# Patient Record
Sex: MALE | Race: WHITE | NOT HISPANIC OR LATINO | Employment: FULL TIME | URBAN - METROPOLITAN AREA
[De-identification: names, ages, dates, MRNs, and addresses within clinical notes are randomized per-mention and may not be internally consistent; named-entity substitution may affect disease eponyms.]

---

## 2023-11-03 ENCOUNTER — TRANSFERRED RECORDS (OUTPATIENT)
Dept: HEALTH INFORMATION MANAGEMENT | Facility: CLINIC | Age: 53
End: 2023-11-03
Payer: COMMERCIAL

## 2023-11-03 ENCOUNTER — TRANSFERRED RECORDS (OUTPATIENT)
Dept: LAB | Facility: HOSPITAL | Age: 53
End: 2023-11-03

## 2023-11-03 ENCOUNTER — TRANSFERRED RECORDS (OUTPATIENT)
Dept: HEALTH INFORMATION MANAGEMENT | Facility: CLINIC | Age: 53
End: 2023-11-03

## 2023-11-09 ENCOUNTER — TRANSFERRED RECORDS (OUTPATIENT)
Dept: HEALTH INFORMATION MANAGEMENT | Facility: CLINIC | Age: 53
End: 2023-11-09

## 2023-11-09 ENCOUNTER — MEDICAL CORRESPONDENCE (OUTPATIENT)
Dept: HEALTH INFORMATION MANAGEMENT | Facility: CLINIC | Age: 53
End: 2023-11-09
Payer: COMMERCIAL

## 2023-11-10 ENCOUNTER — OFFICE VISIT (OUTPATIENT)
Dept: FAMILY MEDICINE | Facility: CLINIC | Age: 53
End: 2023-11-10
Payer: COMMERCIAL

## 2023-11-10 VITALS
HEIGHT: 73 IN | BODY MASS INDEX: 31.51 KG/M2 | DIASTOLIC BLOOD PRESSURE: 84 MMHG | RESPIRATION RATE: 18 BRPM | OXYGEN SATURATION: 97 % | SYSTOLIC BLOOD PRESSURE: 136 MMHG | HEART RATE: 60 BPM | WEIGHT: 237.8 LBS | TEMPERATURE: 96.7 F

## 2023-11-10 DIAGNOSIS — Z11.3 SCREEN FOR STD (SEXUALLY TRANSMITTED DISEASE): ICD-10-CM

## 2023-11-10 DIAGNOSIS — Z13.1 SCREENING FOR DIABETES MELLITUS: ICD-10-CM

## 2023-11-10 DIAGNOSIS — Z11.4 SCREENING FOR HIV (HUMAN IMMUNODEFICIENCY VIRUS): ICD-10-CM

## 2023-11-10 DIAGNOSIS — N30.00 ACUTE CYSTITIS WITHOUT HEMATURIA: ICD-10-CM

## 2023-11-10 DIAGNOSIS — K63.89 COLONIC MASS: ICD-10-CM

## 2023-11-10 DIAGNOSIS — E78.5 HYPERLIPIDEMIA LDL GOAL <100: Primary | ICD-10-CM

## 2023-11-10 LAB
ALBUMIN MFR UR ELPH: 13.1 MG/DL
ALBUMIN SERPL BCG-MCNC: 4.8 G/DL (ref 3.5–5.2)
ALBUMIN UR-MCNC: NEGATIVE MG/DL
ALP SERPL-CCNC: 68 U/L (ref 40–129)
ALT SERPL W P-5'-P-CCNC: 37 U/L (ref 0–70)
AMORPH CRY #/AREA URNS HPF: ABNORMAL /HPF
ANION GAP SERPL CALCULATED.3IONS-SCNC: 11 MMOL/L (ref 7–15)
APPEARANCE UR: ABNORMAL
AST SERPL W P-5'-P-CCNC: 24 U/L (ref 0–45)
BACTERIA #/AREA URNS HPF: ABNORMAL /HPF
BASOPHILS # BLD AUTO: 0 10E3/UL (ref 0–0.2)
BASOPHILS NFR BLD AUTO: 1 %
BILIRUB SERPL-MCNC: 0.5 MG/DL
BILIRUB UR QL STRIP: NEGATIVE
BUN SERPL-MCNC: 15 MG/DL (ref 6–20)
CALCIUM SERPL-MCNC: 9.5 MG/DL (ref 8.6–10)
CHLORIDE SERPL-SCNC: 103 MMOL/L (ref 98–107)
CHOLEST SERPL-MCNC: 229 MG/DL
COLOR UR AUTO: YELLOW
CREAT SERPL-MCNC: 0.83 MG/DL (ref 0.67–1.17)
CREAT UR-MCNC: 113.4 MG/DL
DEPRECATED HCO3 PLAS-SCNC: 26 MMOL/L (ref 22–29)
EGFRCR SERPLBLD CKD-EPI 2021: >90 ML/MIN/1.73M2
EOSINOPHIL # BLD AUTO: 0.3 10E3/UL (ref 0–0.7)
EOSINOPHIL NFR BLD AUTO: 4 %
ERYTHROCYTE [DISTWIDTH] IN BLOOD BY AUTOMATED COUNT: 13 % (ref 10–15)
GLUCOSE SERPL-MCNC: 92 MG/DL (ref 70–99)
GLUCOSE UR STRIP-MCNC: NEGATIVE MG/DL
HBA1C MFR BLD: 5.6 % (ref 0–5.6)
HCT VFR BLD AUTO: 46.8 % (ref 40–53)
HDLC SERPL-MCNC: 44 MG/DL
HGB BLD-MCNC: 15.6 G/DL (ref 13.3–17.7)
HGB UR QL STRIP: ABNORMAL
IMM GRANULOCYTES # BLD: 0 10E3/UL
IMM GRANULOCYTES NFR BLD: 0 %
KETONES UR STRIP-MCNC: NEGATIVE MG/DL
LDLC SERPL CALC-MCNC: 154 MG/DL
LEUKOCYTE ESTERASE UR QL STRIP: ABNORMAL
LYMPHOCYTES # BLD AUTO: 2.2 10E3/UL (ref 0.8–5.3)
LYMPHOCYTES NFR BLD AUTO: 31 %
MCH RBC QN AUTO: 30.1 PG (ref 26.5–33)
MCHC RBC AUTO-ENTMCNC: 33.3 G/DL (ref 31.5–36.5)
MCV RBC AUTO: 90 FL (ref 78–100)
MONOCYTES # BLD AUTO: 0.7 10E3/UL (ref 0–1.3)
MONOCYTES NFR BLD AUTO: 10 %
NEUTROPHILS # BLD AUTO: 3.7 10E3/UL (ref 1.6–8.3)
NEUTROPHILS NFR BLD AUTO: 54 %
NITRATE UR QL: POSITIVE
NONHDLC SERPL-MCNC: 185 MG/DL
PH UR STRIP: 5.5 [PH] (ref 5–7)
PLATELET # BLD AUTO: 229 10E3/UL (ref 150–450)
POTASSIUM SERPL-SCNC: 4.4 MMOL/L (ref 3.4–5.3)
PROT SERPL-MCNC: 7.8 G/DL (ref 6.4–8.3)
PROT/CREAT 24H UR: 0.12 MG/MG CR (ref 0–0.2)
RBC # BLD AUTO: 5.19 10E6/UL (ref 4.4–5.9)
RBC #/AREA URNS AUTO: ABNORMAL /HPF
SODIUM SERPL-SCNC: 140 MMOL/L (ref 135–145)
SP GR UR STRIP: 1.02 (ref 1–1.03)
SQUAMOUS #/AREA URNS AUTO: ABNORMAL /LPF
TRIGL SERPL-MCNC: 154 MG/DL
UROBILINOGEN UR STRIP-ACNC: 0.2 E.U./DL
WBC # BLD AUTO: 6.9 10E3/UL (ref 4–11)
WBC #/AREA URNS AUTO: ABNORMAL /HPF
WBC CLUMPS #/AREA URNS HPF: PRESENT /HPF

## 2023-11-10 PROCEDURE — 80053 COMPREHEN METABOLIC PANEL: CPT | Performed by: PHYSICIAN ASSISTANT

## 2023-11-10 PROCEDURE — 87491 CHLMYD TRACH DNA AMP PROBE: CPT | Performed by: PHYSICIAN ASSISTANT

## 2023-11-10 PROCEDURE — 87591 N.GONORRHOEAE DNA AMP PROB: CPT | Performed by: PHYSICIAN ASSISTANT

## 2023-11-10 PROCEDURE — 83036 HEMOGLOBIN GLYCOSYLATED A1C: CPT | Performed by: PHYSICIAN ASSISTANT

## 2023-11-10 PROCEDURE — 81001 URINALYSIS AUTO W/SCOPE: CPT | Performed by: PHYSICIAN ASSISTANT

## 2023-11-10 PROCEDURE — 87186 SC STD MICRODIL/AGAR DIL: CPT | Performed by: PHYSICIAN ASSISTANT

## 2023-11-10 PROCEDURE — 80061 LIPID PANEL: CPT | Performed by: PHYSICIAN ASSISTANT

## 2023-11-10 PROCEDURE — 99204 OFFICE O/P NEW MOD 45 MIN: CPT | Performed by: PHYSICIAN ASSISTANT

## 2023-11-10 PROCEDURE — 85025 COMPLETE CBC W/AUTO DIFF WBC: CPT | Performed by: PHYSICIAN ASSISTANT

## 2023-11-10 PROCEDURE — 87389 HIV-1 AG W/HIV-1&-2 AB AG IA: CPT | Performed by: PHYSICIAN ASSISTANT

## 2023-11-10 PROCEDURE — 36415 COLL VENOUS BLD VENIPUNCTURE: CPT | Performed by: PHYSICIAN ASSISTANT

## 2023-11-10 PROCEDURE — 84156 ASSAY OF PROTEIN URINE: CPT | Performed by: PHYSICIAN ASSISTANT

## 2023-11-10 PROCEDURE — 87086 URINE CULTURE/COLONY COUNT: CPT | Performed by: PHYSICIAN ASSISTANT

## 2023-11-10 ASSESSMENT — PAIN SCALES - GENERAL: PAINLEVEL: NO PAIN (0)

## 2023-11-10 NOTE — PATIENT INSTRUCTIONS
Lab work done today.     Will follow-up on MyChart or over the phone.     Consider CT Coronary Artery Calcium score

## 2023-11-10 NOTE — PROGRESS NOTES
"Assessment & Plan   Hyperlipidemia LDL goal <100  Total cholesterol at 234 6 months ago and LDL at 171.  Has been working on lifestyle changes.  We will recheck his lipid panel today.  Has no history of heart disease in his family I will be at not early onset coronary artery disease.  We did discuss a calcium score and if his cholesterol remains significantly elevated patient will like to proceed with this testing.  - Lipid panel reflex to direct LDL Fasting; Future  - Comprehensive metabolic panel; Future    Colonic mass  Being evaluated by [facility].  On screening colonoscopy had a large polypoid mass in his colon that requires resection.  He is having a CT of his colon and recently got a CEA testing done.  We will continue to follow-up with [facility].  - CBC with Platelets & Differential; Future    Acute cystitis without hematuria  Per results urine culture showed E. coli that was successfully treated.  Patient has no other symptoms at this time.  He continues to have slightly darker and more foamy urine.  We discussed that this is unlikely be related to a UTI but we will recheck today as well as a urine protein to ensure he is not having significant proteinuria.  - UA Macroscopic with reflex to Microscopic and Culture - Lab Collect; Future  - Protein  random urine; Future    Screening for HIV (human immunodeficiency virus)  Due for screening.   - HIV Antigen Antibody Combo; Future    Screen for STD (sexually transmitted disease)  Requested screening today.   - NEISSERIA GONORRHOEA PCR; Future  - CHLAMYDIA TRACHOMATIS PCR; Future    Screening for diabetes mellitus  Due for screening. BMI 31.   - Hemoglobin A1c; Future       BMI:   Estimated body mass index is 31.81 kg/m  as calculated from the following:    Height as of this encounter: 1.842 m (6' 0.5\").    Weight as of this encounter: 107.9 kg (237 lb 12.8 oz).       Peña Hill PA-C  [facility]    Claudine Latham is a " "53 year old, presenting for the following health issues:  Establish Care, Lipids, and STD        11/10/2023     8:02 AM   Additional Questions   Roomed by Chiquis ULLOA CMA   Accompanied by Self     HPI   Establish care  Lipids  -Has not had a physical since last year. At the time he was told his cholesterol was elevated, decided that they were going to try and control with diet and exercise. Never went back to follow up due to moving.     Follow up on UTI  Seen in the urgency room on 10/12/23  treated with antibiotic     Follow up on colonoscopy  -Says that he had a colonoscopy and has a mass that they determined is non cancerous but needs to have a section of his colon removed.     Health Maintenance   - He and his wife would like him to do the HIV screenings, Mentions also wanting an A1C    Review of Systems   See HPI         Objective    /84 (BP Location: Right arm, Patient Position: Sitting, Cuff Size: Adult Large)   Pulse 60   Temp (!) 96.7  F (35.9  C) (Tympanic)   Resp 18   Ht 1.842 m (6' 0.5\")   Wt 107.9 kg (237 lb 12.8 oz)   SpO2 97%   BMI 31.81 kg/m    Body mass index is 31.81 kg/m .  Physical Exam   Constitutional: healthy, alert, and no distress  Head: Normocephalic. Atraumatic  Eyes: No conjunctival injection, sclera anicteric  Cardiovascular: RRR. No murmurs, clicks, gallops, or rubs. No peripheral edema.   Respiratory: No resp distress. Lungs CTAB bilaterally.   Musculoskeletal: extremities normal- no gross deformities noted, and normal muscle tone  Skin: no suspicious lesions or rashes  Neurologic: Gait normal. CN 2-12 grossly intact  Psychiatric: mentation appears normal and affect normal/bright                 "

## 2023-11-10 NOTE — COMMUNITY RESOURCES LIST (ENGLISH)
11/10/2023    [employer] [facility]  N/A  For questions about this resource list or additional care needs, please contact your primary care clinic or care manager.  Phone: [phone]   Email: N/A   Address: [address] [place] [id number]   Hours: N/A        Financial Stability       Utility payment assistance  1  [place] [facility] [place] ([facility]) [facility] Office - Energy Assistance Program Distance: 2.9 miles      Phone/Virtual   [address] [place] [address]  Language: English  Hours: Mon - Fri 8:00 AM - 4:30 PM  Fees: Free   Phone: (665) 119-2424 Email: anjana@[facility] Website: https://www.[facility]/agency-information     2  [employer] Distance: 16.16 miles      In-Person, Phone/Virtual   [address] [place] [address]  Language: English  Hours: Mon - Sun Appt. Only  Fees: Free   Phone: (286) 393-5895 Email: nancy@[employer] Website: http://www.communityhelpinghand.org          Important Numbers & Websites       Emergency Services   911  [facility]   311  Poison Control   (736) 167-7214  Suicide Prevention Lifeline   (867) 276-8375 (TALK)  Child Abuse Hotline   (386) 334-7045 (4-A-Child)  Sexual Assault Hotline   (728) 642-7113 (HOPE)  National Runaway Safeline   (958) 986-6088 (RUNAWAY)  All-Options Talkline   (697) 960-3596  Substance Abuse Referral   (673) 836-4897 (HELP)

## 2023-11-11 LAB
BACTERIA UR CULT: ABNORMAL
C TRACH DNA SPEC QL NAA+PROBE: NEGATIVE
HIV 1+2 AB+HIV1 P24 AG SERPL QL IA: NONREACTIVE
N GONORRHOEA DNA SPEC QL NAA+PROBE: NEGATIVE

## 2023-11-13 RX ORDER — NITROFURANTOIN 25; 75 MG/1; MG/1
100 CAPSULE ORAL 2 TIMES DAILY
Qty: 14 CAPSULE | Refills: 0 | Status: SHIPPED | OUTPATIENT
Start: 2023-11-13 | End: 2023-12-11

## 2023-11-20 ENCOUNTER — TRANSFERRED RECORDS (OUTPATIENT)
Dept: HEALTH INFORMATION MANAGEMENT | Facility: CLINIC | Age: 53
End: 2023-11-20
Payer: COMMERCIAL

## 2023-12-11 ENCOUNTER — ANCILLARY PROCEDURE (OUTPATIENT)
Dept: GENERAL RADIOLOGY | Facility: CLINIC | Age: 53
End: 2023-12-11
Attending: PHYSICIAN ASSISTANT
Payer: COMMERCIAL

## 2023-12-11 ENCOUNTER — OFFICE VISIT (OUTPATIENT)
Dept: FAMILY MEDICINE | Facility: CLINIC | Age: 53
End: 2023-12-11
Payer: COMMERCIAL

## 2023-12-11 ENCOUNTER — E-CONSULT (OUTPATIENT)
Dept: UROLOGY | Facility: CLINIC | Age: 53
End: 2023-12-11

## 2023-12-11 VITALS
OXYGEN SATURATION: 99 % | HEART RATE: 57 BPM | BODY MASS INDEX: 32.87 KG/M2 | SYSTOLIC BLOOD PRESSURE: 136 MMHG | HEIGHT: 73 IN | DIASTOLIC BLOOD PRESSURE: 80 MMHG | WEIGHT: 248 LBS | RESPIRATION RATE: 16 BRPM | TEMPERATURE: 97.4 F

## 2023-12-11 DIAGNOSIS — R35.0 URINARY FREQUENCY: ICD-10-CM

## 2023-12-11 DIAGNOSIS — N30.00 ACUTE CYSTITIS WITHOUT HEMATURIA: Primary | ICD-10-CM

## 2023-12-11 DIAGNOSIS — K76.0 FATTY LIVER: ICD-10-CM

## 2023-12-11 LAB
ALBUMIN UR-MCNC: NEGATIVE MG/DL
APPEARANCE UR: ABNORMAL
BACTERIA #/AREA URNS HPF: ABNORMAL /HPF
BILIRUB UR QL STRIP: NEGATIVE
COLOR UR AUTO: YELLOW
GLUCOSE UR STRIP-MCNC: NEGATIVE MG/DL
HGB UR QL STRIP: ABNORMAL
HOLD SPECIMEN: NORMAL
KETONES UR STRIP-MCNC: NEGATIVE MG/DL
LEUKOCYTE ESTERASE UR QL STRIP: ABNORMAL
MUCOUS THREADS #/AREA URNS LPF: PRESENT /LPF
NITRATE UR QL: POSITIVE
PH UR STRIP: 6 [PH] (ref 5–7)
PSA SERPL DL<=0.01 NG/ML-MCNC: 1.95 NG/ML (ref 0–3.5)
RBC #/AREA URNS AUTO: ABNORMAL /HPF
SP GR UR STRIP: 1.01 (ref 1–1.03)
UROBILINOGEN UR STRIP-ACNC: 0.2 E.U./DL
WBC #/AREA URNS AUTO: ABNORMAL /HPF
WBC CLUMPS #/AREA URNS HPF: PRESENT /HPF

## 2023-12-11 PROCEDURE — 99215 OFFICE O/P EST HI 40 MIN: CPT | Performed by: PHYSICIAN ASSISTANT

## 2023-12-11 PROCEDURE — 81001 URINALYSIS AUTO W/SCOPE: CPT | Performed by: PHYSICIAN ASSISTANT

## 2023-12-11 PROCEDURE — 87086 URINE CULTURE/COLONY COUNT: CPT | Performed by: PHYSICIAN ASSISTANT

## 2023-12-11 PROCEDURE — 36415 COLL VENOUS BLD VENIPUNCTURE: CPT | Performed by: PHYSICIAN ASSISTANT

## 2023-12-11 PROCEDURE — 74018 RADEX ABDOMEN 1 VIEW: CPT | Mod: TC | Performed by: RADIOLOGY

## 2023-12-11 PROCEDURE — 87186 SC STD MICRODIL/AGAR DIL: CPT | Performed by: PHYSICIAN ASSISTANT

## 2023-12-11 PROCEDURE — 99207 E-CONSULT TO UROLOGY (ADULT OUTPT PROVIDER TO SPECIALIST WRITTEN QUESTION & RESPONSE): CPT | Performed by: PHYSICIAN ASSISTANT

## 2023-12-11 PROCEDURE — G0103 PSA SCREENING: HCPCS | Performed by: PHYSICIAN ASSISTANT

## 2023-12-11 RX ORDER — CIPROFLOXACIN 500 MG/1
500 TABLET, FILM COATED ORAL 2 TIMES DAILY
Qty: 14 TABLET | Refills: 0 | Status: SHIPPED | OUTPATIENT
Start: 2023-12-11 | End: 2023-12-22

## 2023-12-11 ASSESSMENT — PAIN SCALES - GENERAL: PAINLEVEL: NO PAIN (0)

## 2023-12-11 NOTE — PROGRESS NOTES
Assessment & Plan   Acute cystitis without hematuria  Urinary frequency  Patient returns to clinic due to persistent urinary frequency.  He has been treated twice for a UTI that has not resolved.  Initially with cephalexin with Macrobid.  Urine cultures were positive for the exact same bacteria with same susceptibilities and despite being treated with 2 antibiotics that it was susceptible to his symptoms persist.  UA today was again grossly positive for infection.  A KUB x-ray was done to evaluate for a retained bladder/renal stone as the nidus for his infection but that was unremarkable.  PSA testing done as well today which was negative.  He was started on ciprofloxacin 500 mg twice daily and an E consult to urology was placed due to his persistent unsuccessfully treated UTI.  Unfortunately the timing of this is difficult as he is a scheduled right colectomy due to a colonic mass that was discovered on a screening colonoscopy.  This is scheduled for less than a month away.  The E consult to urology is to help us determine the next steps quickly in order for him to still get his colectomy done in less than 1 month.  Patient in agreement with the plan and will await E consult recommendations.  - ciprofloxacin (CIPRO) 500 MG tablet; Take 1 tablet (500 mg) by mouth 2 times daily  - Adult E-Consult to Urology (Outpt Provider to Specialist Written Question & Response)  - UA Macroscopic with reflex to Microscopic and Culture - Clinic Collect  - Urine Microscopic Exam  - Urine Culture  - PSA, screen; Future  - XR KUB; Future  - ciprofloxacin (CIPRO) 500 MG tablet; Take 1 tablet (500 mg) by mouth 2 times daily    Fatty liver  Seen on abdominal CT scan from [facility].  Release of record was done today to obtain those results.  He has a known history of more significant alcohol use disorder  in the remote past and he is also obese.  This would be too common etiologies for his fatty liver but will await further records  "from [facility].    I spent a total of 59 minutes on the day of the visit.   Time spent by me doing chart review, history and exam, documentation and further activities per the note     BMI:   Estimated body mass index is 33.15 kg/m  as calculated from the following:    Height as of this encounter: 1.842 m (6' 0.52\").    Weight as of this encounter: 112.5 kg (248 lb).     YASH [facility]    Claudine Latham is a 53 year old, presenting for the following health issues:  Urinary Problem        12/11/2023     6:43 AM   Additional Questions   Roomed by Nisreen IQBAL     [facility]   Patient is following up with his urinary tract infection. He has taken two rounds of antibiotics and would like to make sure the infection is gone.     Review of Systems   See [facility]       Objective    /80 (BP Location: Right arm, Cuff Size: Adult Large)   Pulse 57   Temp 97.4  F (36.3  C) (Tympanic)   Resp 16   Ht 1.842 m (6' 0.52\")   Wt 112.5 kg (248 lb)   SpO2 99%   BMI 33.15 kg/m    Body mass index is 33.15 kg/m .  Physical Exam   Constitutional: healthy, alert, and no distress  Head: Normocephalic. Atraumatic  Eyes: No conjunctival injection, sclera anicteric  Respiratory: No resp distress.  Musculoskeletal: extremities normal- no gross deformities noted, and normal muscle tone  Neurologic: Gait normal. CN 2-12 grossly intact  Psychiatric: mentation appears normal and affect normal/bright                 "

## 2023-12-11 NOTE — PROGRESS NOTES
12/11/2023     E-Consult has been accepted.    Interprofessional consultation requested by:  Peña Hill PA-C      Clinical Question/Purpose: MY CLINICAL QUESTION IS: The patient is a 53-year-old male with history of fatty liver disease, recent cecal mass found on colonoscopy (CEA testing negative), who returns to clinic for persistent urinary frequency and concern for ongoing UTI.  He was initially seen at an outside hospital per care everywhere and was diagnosed with a UTI.  Urine culture showed E. coli that was sensitive to Keflex.  He was started on this without improvement.Then he presented to clinic with me, and again his urinalysis and culture showed persistent infection with the same sensitivities on urine culture. He was treated with Marcobid. Unfortunately the patient's symptoms persisted and was seen again today due to persistent urinary frequency.  He has no systemic symptoms and his vitals and exam are unremarkable.  A KUB was done in clinic today to evaluate for a possible bladder stone but was unremarkable. PSA is wnl. I started him on ciprofloxacin 500 mg twice daily based previous culture results.    Patient assessment and information reviewed: urine culture result, 11/10/2023, E. Coli resistant to bactrim; above clinical question    Specimen: Urine - Urine specimen (specimen)  Component 2 mo ago   CULTURE RESULT Abnormal    CULTURE >100,000 CFU/mL Escherichia coli   Resulting Agency [facility]   Susceptibility    Organism Antibiotic Susceptibility   Escherichia coli TRIMETHOPRIM/SULF >=16/304: R   Escherichia coli AMPICILLIN <=2: S   Escherichia coli CEFAZOLIN-UC <=4: S    Comment: Cefazolin-UC interpretations are for therapy of uncomplicated UTIs due to E.coli, K.pneumoniae, or P.mirablis. Cefazolin breakpoint is used as a surrogate to predict results for the oral agents - cefdinir, cefuroxime, and cephalexin, when used for therapy of uncomplicated UTIs  due to E coli, K, pneumoniae, and P. mirabilis. The FDA recommends cefadroxil susceptibility can be deduced from cefazolin.   Escherichia coli GENTAMICIN <=1: S   Escherichia coli CEFTRIAXONE <=1: S   Escherichia coli CEFTAZIDIME <=1: S   Escherichia coli LEVOFLOXACIN <=0.12: S   Escherichia coli CIPROFLOXACIN <=0.25: S   Escherichia coli PIPERACILLIN/TAZO <=4: S   Escherichia coli AMPICILLIN/SULBACTAM <=2: S   Escherichia coli CEFEPIME <=1: S   Escherichia coli TOBRAMYCIN <=1: S   Escherichia coli MEROPENEM <=0.25: S   Escherichia coli NITROFURANTOIN <=16: S     Outside urine culture with same result    Recommendations: 52 yo M with a persistent UTI with same organism despite treatment with keflex and nitrofurantoin    Agree with starting on fluoroquinolone based on culture results, would give 10 day course    Persistence of same organism suggests incomplete clearance of the organism and a reservoir or nidus of infection. Common reasons from incomplete bladder emptying, bladder diverticulum, chronic prostatitis. Stones usually do not harbor E. Coli. I would make sure that he is emptying his bladder and if there is any concern for incomplete bladder emptying start him on tamsulosin. Get CT urogram to assess for any anatomic issues (stones, diverticulum etc). 10 day course cipro. If he is still having issues after this (or if anything comes up on CT urogram that needs addressing), then needs formal urology referral    Recs:  10 days cipro  Start tamsulosin if concern for incomplete bladder emptying  Get ct urogram  If any issues on ctu or if persistent issues then formal consult    The recommendations provided in this E-Consult are based on a review of clinical data pertinent to the clinical question presented, without a review of the patient's complete medical record or, the benefit of a comprehensive in-person or virtual patient evaluation. This consultation should not replace the clinical judgement and evaluation  of the provider ordering this E-Consult. Any new clinical issues, or changes in patient status since the filing of this E-Consult will need to be taken into account when assessing these recommendations. Please contact me if you have further questions.    My total time spent reviewing clinical information and formulating assessment was 5 minutes.      MD Waldemar Herrera MD   [facility]  [phone] clinic phone

## 2023-12-11 NOTE — PATIENT INSTRUCTIONS
KUB x-ray was negative.     PSA within normal limits.     Start Cipro as discussed.     E-consult is pending. Will follow-up after recommendations.

## 2023-12-12 ENCOUNTER — TELEPHONE (OUTPATIENT)
Dept: FAMILY MEDICINE | Facility: CLINIC | Age: 53
End: 2023-12-12
Payer: COMMERCIAL

## 2023-12-12 DIAGNOSIS — N30.00 ACUTE CYSTITIS WITHOUT HEMATURIA: Primary | ICD-10-CM

## 2023-12-12 LAB — BACTERIA UR CULT: ABNORMAL

## 2023-12-12 RX ORDER — CIPROFLOXACIN 500 MG/1
500 TABLET, FILM COATED ORAL 2 TIMES DAILY
Qty: 6 TABLET | Refills: 0 | Status: SHIPPED | OUTPATIENT
Start: 2023-12-12 | End: 2023-12-15

## 2023-12-12 RX ORDER — TAMSULOSIN HYDROCHLORIDE 0.4 MG/1
0.4 CAPSULE ORAL DAILY
Qty: 10 CAPSULE | Refills: 0 | Status: SHIPPED | OUTPATIENT
Start: 2023-12-12 | End: 2023-12-22

## 2023-12-12 NOTE — TELEPHONE ENCOUNTER
Please call Yeison. Recommendations from E-consult are as follows.     Take Cipro for 10 days, instead of 7. I will send an additional 3 day prescription of Cipro to his pharmacy.   Start Flomax to help him empty his bladder completely. New prescription to the pharmacy. Also take for 10 days.   Get CT Urogram (special CT scan of the urinary tract). He can call [phone] to schedule at [place]. Can be done elsewhere if he can get it done sooner.     We will follow-up after we get the results of his CT scan.

## 2023-12-20 ENCOUNTER — HOSPITAL ENCOUNTER (OUTPATIENT)
Dept: CT IMAGING | Facility: CLINIC | Age: 53
Discharge: HOME | End: 2023-12-20
Attending: PHYSICIAN ASSISTANT | Admitting: PHYSICIAN ASSISTANT
Payer: COMMERCIAL

## 2023-12-20 DIAGNOSIS — N30.00 ACUTE CYSTITIS WITHOUT HEMATURIA: ICD-10-CM

## 2023-12-20 LAB — RADIOLOGIST FLAGS: ABNORMAL

## 2023-12-20 PROCEDURE — 250N000011 HC RX IP 250 OP 636: Performed by: PHYSICIAN ASSISTANT

## 2023-12-20 PROCEDURE — 74178 CT ABD&PLV WO CNTR FLWD CNTR: CPT

## 2023-12-20 PROCEDURE — 250N000009 HC RX 250: Performed by: PHYSICIAN ASSISTANT

## 2023-12-20 RX ORDER — IOPAMIDOL 755 MG/ML
100 INJECTION, SOLUTION INTRAVASCULAR ONCE
Status: COMPLETED | OUTPATIENT
Start: 2023-12-20 | End: 2023-12-20

## 2023-12-20 RX ADMIN — SODIUM CHLORIDE 100 ML: 9 INJECTION, SOLUTION INTRAVENOUS at 14:45

## 2023-12-20 RX ADMIN — IOPAMIDOL 100 ML: 755 INJECTION, SOLUTION INTRAVENOUS at 14:44

## 2023-12-22 ENCOUNTER — OFFICE VISIT (OUTPATIENT)
Dept: FAMILY MEDICINE | Facility: CLINIC | Age: 53
End: 2023-12-22
Payer: COMMERCIAL

## 2023-12-22 VITALS
BODY MASS INDEX: 33.15 KG/M2 | TEMPERATURE: 97 F | RESPIRATION RATE: 12 BRPM | SYSTOLIC BLOOD PRESSURE: 130 MMHG | DIASTOLIC BLOOD PRESSURE: 80 MMHG | OXYGEN SATURATION: 98 % | WEIGHT: 248 LBS | HEART RATE: 57 BPM

## 2023-12-22 DIAGNOSIS — K63.89 COLONIC MASS: ICD-10-CM

## 2023-12-22 DIAGNOSIS — K76.0 FATTY LIVER: ICD-10-CM

## 2023-12-22 DIAGNOSIS — Z01.818 PREOP GENERAL PHYSICAL EXAM: Primary | ICD-10-CM

## 2023-12-22 DIAGNOSIS — N30.00 ACUTE CYSTITIS WITHOUT HEMATURIA: ICD-10-CM

## 2023-12-22 LAB
ALBUMIN UR-MCNC: NEGATIVE MG/DL
APPEARANCE UR: CLEAR
BILIRUB UR QL STRIP: NEGATIVE
COLOR UR AUTO: YELLOW
GLUCOSE UR STRIP-MCNC: NEGATIVE MG/DL
HGB UR QL STRIP: NEGATIVE
KETONES UR STRIP-MCNC: NEGATIVE MG/DL
LEUKOCYTE ESTERASE UR QL STRIP: NEGATIVE
NITRATE UR QL: NEGATIVE
PH UR STRIP: 5.5 [PH] (ref 5–7)
SP GR UR STRIP: 1.01 (ref 1–1.03)
UROBILINOGEN UR STRIP-ACNC: 0.2 E.U./DL

## 2023-12-22 PROCEDURE — 81003 URINALYSIS AUTO W/O SCOPE: CPT | Performed by: PHYSICIAN ASSISTANT

## 2023-12-22 PROCEDURE — 87086 URINE CULTURE/COLONY COUNT: CPT | Performed by: PHYSICIAN ASSISTANT

## 2023-12-22 PROCEDURE — 99214 OFFICE O/P EST MOD 30 MIN: CPT | Performed by: PHYSICIAN ASSISTANT

## 2023-12-22 ASSESSMENT — PAIN SCALES - GENERAL: PAINLEVEL: NO PAIN (0)

## 2023-12-22 NOTE — PROGRESS NOTES
[facility]  [address] [place] [address]  Phone: [phone]  Fax: [phone]  Primary Provider: Amadeo Lockwood  Pre-op Performing Provider: AMADEO LOCKWOOD    PREOPERATIVE EVALUATION:  Today's date: 12/22/2023    Yeison is a 53 year old, presenting for the following:  Pre-Op Exam (/)        12/22/2023     6:54 AM   Additional Questions   Roomed by Lanette PLAZA   Accompanied by Self         12/22/2023     6:54 AM   Patient Reported Additional Medications   Patient reports taking the following new medications .     Surgical Information:  Surgery/Procedure: Robotic Colectomy Right  Surgery Location: [facility] OR  Surgeon: Dr. Annamaria Meenzes  Surgery Date: 01/08/24  Time of Surgery: 7:30am  Where patient plans to recover: At home with family  Fax number for surgical facility: Note does not need to be faxed, will be available electronically in Epic.    Assessment & Plan     The proposed surgical procedure is considered INTERMEDIATE risk.    Problem List Items Addressed This Visit          Digestive    Fatty liver     Other Visit Diagnoses       Preop general physical exam    -  Primary    Colonic mass        Acute cystitis without hematuria        Relevant Orders    UA Macroscopic with reflex to Microscopic and Culture - Clinic Collect (Completed)    Urine Culture Aerobic Bacterial - lab collect           - No identified additional risk factors other than previously addressed    Antiplatelet or Anticoagulation Medication Instructions:   - Patient is on no antiplatelet or anticoagulation medications.    Additional Medication Instructions:  Patient is on no additional chronic medications    RECOMMENDATION:  APPROVAL GIVEN to proceed with proposed procedure, without further diagnostic evaluation.    Subjective   HPI related to upcoming procedure: Pt is a 53 year old male with no significant PMH who presents for pre-op clearance for R partial colectomy. Over the last month,  the patient has had urinary symptoms and a difficult to treat UTI. Over the last 2-3 days he has complete resolution of his urinary symptoms after a 10 day course of Cipro and Flomax. CT urogram was negative for a nidus of infection. He denies any other new or systemic symptoms. Denies any fevers, chills, chest pain, shortness of breath, abdominal pain, nausea, vomiting.         12/22/2023     6:47 AM   Preop Questions   1. Have you ever had a heart attack or stroke? No   2. Have you ever had surgery on your heart or blood vessels, such as a stent placement, a coronary artery bypass, or surgery on an artery in your head, neck, heart, or legs? No   3. Do you have chest pain with activity? No   4. Do you have a history of  heart failure? No   5. Do you currently have a cold, bronchitis or symptoms of other infection? No   6. Do you have a cough, shortness of breath, or wheezing? No   7. Do you or anyone in your family have previous history of blood clots? No   8. Do you or does anyone in your family have a serious bleeding problem such as prolonged bleeding following surgeries or cuts? No   9. Have you ever had problems with anemia or been told to take iron pills? No   10. Have you had any abnormal blood loss such as black, tarry or bloody stools? No   11. Have you ever had a blood transfusion? No   12. Are you willing to have a blood transfusion if it is medically needed before, during, or after your surgery? Yes   13. Have you or any of your relatives ever had problems with anesthesia? No   14. Do you have sleep apnea, excessive snoring or daytime drowsiness? No   15. Do you have any artifical heart valves or other implanted medical devices like a pacemaker, defibrillator, or continuous glucose monitor? No   16. Do you have artificial joints? No   17. Are you allergic to latex? No     Health Care Directive:  Patient does not have a Health Care Directive or Living Will: Discussed advance care planning with patient;  information given to patient to review.    Preoperative Review of :   reviewed - no record of controlled substances prescribed.    Status of Chronic Conditions:  See problem list for active medical problems.  Problems all longstanding and stable, except as noted/documented.  See ROS for pertinent symptoms related to these conditions.    Review of Systems  CONSTITUTIONAL: NEGATIVE for fever, chills, change in weight  INTEGUMENTARY/SKIN: NEGATIVE for worrisome rashes, moles or lesions  EYES: NEGATIVE for vision changes or irritation  ENT/MOUTH: NEGATIVE for ear, mouth and throat problems  RESP: NEGATIVE for significant cough or SOB  CV: NEGATIVE for chest pain, palpitations or peripheral edema  GI: NEGATIVE for nausea, abdominal pain, heartburn, or change in bowel habits  : NEGATIVE for frequency, dysuria, or hematuria  MUSCULOSKELETAL: NEGATIVE for significant arthralgias or myalgia  NEURO: NEGATIVE for weakness, dizziness or paresthesias  ENDOCRINE: NEGATIVE for temperature intolerance, skin/hair changes  HEME: NEGATIVE for bleeding problems  PSYCHIATRIC: NEGATIVE for changes in mood or affect    Patient Active Problem List    Diagnosis Date Noted    Fatty liver 12/11/2023     Priority: Medium      History reviewed. No pertinent past medical history.  History reviewed. No pertinent surgical history.  No current outpatient medications on file.       No Known Allergies     Social History     Tobacco Use    Smoking status: Never    Smokeless tobacco: Never   Substance Use Topics    Alcohol use: Not on file     History   Drug Use Not on file         Objective     /80   Pulse 57   Temp 97  F (36.1  C) (Tympanic)   Resp 12   Wt 112.5 kg (248 lb)   SpO2 98%   BMI 33.15 kg/m      Physical Exam    GENERAL APPEARANCE: healthy, alert and no distress     EYES: EOMI,  PERRL     HENT: ear canals and TM's normal and nose and mouth without ulcers or lesions     NECK: no adenopathy, no asymmetry, masses, or scars  and thyroid normal to palpation     RESP: lungs clear to auscultation - no rales, rhonchi or wheezes     CV: regular rates and rhythm, normal S1 S2, no S3 or S4 and no murmur, click or rub     ABDOMEN:  soft, nontender, no HSM or masses and bowel sounds normal     MS: extremities normal- no gross deformities noted, no evidence of inflammation in joints, FROM in all extremities.     SKIN: no suspicious lesions or rashes     NEURO: Normal strength and tone, sensory exam grossly normal, mentation intact and speech normal     PSYCH: mentation appears normal. and affect normal/bright     LYMPHATICS: No cervical adenopathy    Recent Labs   Lab Test 11/10/23  0831   HGB 15.6         POTASSIUM 4.4   CR 0.83   A1C 5.6      Diagnostics:  Results for orders placed or performed in visit on 12/22/23   UA Macroscopic with reflex to Microscopic and Culture - Clinic Collect     Status: Normal    Specimen: Urine, Midstream   Result Value Ref Range    Color Urine Yellow Colorless, Straw, Light Yellow, Yellow    Appearance Urine Clear Clear    Glucose Urine Negative Negative mg/dL    Bilirubin Urine Negative Negative    Ketones Urine Negative Negative mg/dL    Specific Gravity Urine 1.015 1.003 - 1.035    Blood Urine Negative Negative    pH Urine 5.5 5.0 - 7.0    Protein Albumin Urine Negative Negative mg/dL    Urobilinogen Urine 0.2 0.2, 1.0 E.U./dL    Nitrite Urine Negative Negative    Leukocyte Esterase Urine Negative Negative    Narrative    Microscopic not indicated      No EKG required, no history of coronary heart disease, significant arrhythmia, peripheral arterial disease or other structural heart disease.    Revised Cardiac Risk Index (RCRI):  The patient has the following serious cardiovascular risks for perioperative complications:   - No serious cardiac risks = 0 points     RCRI Interpretation: 0 points: Class I (very low risk - 0.4% complication rate)       Signed Electronically by: Peña Hill  PA-BILLY  Copy of this evaluation report is provided to requesting physician.

## 2023-12-22 NOTE — NURSING NOTE
"Chief Complaint   Patient presents with    Pre-Op Exam            /80   Pulse 57   Temp 97  F (36.1  C) (Tympanic)   Resp 12   Wt 112.5 kg (248 lb)   SpO2 98%   BMI 33.15 kg/m   Estimated body mass index is 33.15 kg/m  as calculated from the following:    Height as of 12/11/23: 1.842 m (6' 0.52\").    Weight as of this encounter: 112.5 kg (248 lb).  Patient presents to the clinic using No DME      Health Maintenance that is potentially due pending provider review:    Health Maintenance Due   Topic Date Due    ADVANCE CARE PLANNING  Never done    HEPATITIS C SCREENING  Never done    HEPATITIS B IMMUNIZATION (3 of 3 - 19+ 3-dose series) 09/03/2008    INFLUENZA VACCINE (1) 09/01/2023    COVID-19 Vaccine (3 - 2023-24 season) 09/01/2023    ZOSTER IMMUNIZATION (2 of 2) 04/14/2023                  "

## 2023-12-22 NOTE — PATIENT INSTRUCTIONS
Preparing for Your Surgery  Getting started  A nurse will call you to review your health history and instructions. They will give you an arrival time based on your scheduled surgery time. Please be ready to share:  Your doctor's clinic name and phone number  Your medical, surgical, and anesthesia history  A list of allergies and sensitivities  A list of medicines, including herbal treatments and over-the-counter drugs  Whether the patient has a legal guardian (ask how to send us the papers in advance)  Please tell us if you're pregnant--or if there's any chance you might be pregnant. Some surgeries may injure a fetus (unborn baby), so they require a pregnancy test. Surgeries that are safe for a fetus don't always need a test, and you can choose whether to have one.   If you have a child who's having surgery, please ask for a copy of Preparing for Your Child's Surgery.    Preparing for surgery  Within 10 to 30 days of surgery: Have a pre-op exam (sometimes called an H&P, or History and Physical). This can be done at a clinic or pre-operative center.  If you're having a , you may not need this exam. Talk to your care team.  At your pre-op exam, talk to your care team about all medicines you take. If you need to stop any medicines before surgery, ask when to start taking them again.  We do this for your safety. Many medicines can make you bleed too much during surgery. Some change how well surgery (anesthesia) drugs work.  Call your insurance company to let them know you're having surgery. (If you don't have insurance, call [phone].)  Call your clinic if there's any change in your health. This includes signs of a cold or flu (sore throat, runny nose, cough, rash, fever). It also includes a scrape or scratch near the surgery site.  If you have questions on the day of surgery, call your hospital or surgery center.  Eating and drinking guidelines  For your safety: Unless your surgeon tells you otherwise,  follow the guidelines below.  Eat and drink as usual until 8 hours before you arrive for surgery. After that, no food or milk.  Drink clear liquids until 2 hours before you arrive. These are liquids you can see through, like water, Gatorade, and Propel Water. They also include plain black coffee and tea (no cream or milk), candy, and breath mints. You can spit out gum when you arrive.  If you drink alcohol: Stop drinking it the night before surgery.  If your care team tells you to take medicine on the morning of surgery, it's okay to take it with a sip of water.  Preventing infection  Shower or bathe the night before and morning of your surgery. Follow the instructions your clinic gave you. (If no instructions, use regular soap.)  Don't shave or clip hair near your surgery site. We'll remove the hair if needed.  Don't smoke or vape the morning of surgery. You may chew nicotine gum up to 2 hours before surgery. A nicotine patch is okay.  Note: Some surgeries require you to completely quit smoking and nicotine. Check with your surgeon.  Your care team will make every effort to keep you safe from infection. We will:  Clean our hands often with soap and water (or an alcohol-based hand rub).  Clean the skin at your surgery site with a special soap that kills germs.  Give you a special gown to keep you warm. (Cold raises the risk of infection.)  Wear special hair covers, masks, gowns and gloves during surgery.  Give antibiotic medicine, if prescribed. Not all surgeries need antibiotics.  What to bring on the day of surgery  Photo ID and insurance card  Copy of your health care directive, if you have one  Glasses and hearing aids (bring cases)  You can't wear contacts during surgery  Inhaler and eye drops, if you use them (tell us about these when you arrive)  CPAP machine or breathing device, if you use them  A few personal items, if spending the night  If you have . . .  A pacemaker, ICD (cardiac defibrillator) or other  implant: Bring the ID card.  An implanted stimulator: Bring the remote control.  A legal guardian: Bring a copy of the certified (court-stamped) guardianship papers.  Please remove any jewelry, including body piercings. Leave jewelry and other valuables at home.  If you're going home the day of surgery  You must have a responsible adult drive you home. They should stay with you overnight as well.  If you don't have someone to stay with you, and you aren't safe to go home alone, we may keep you overnight. Insurance often won't pay for this.  After surgery  If it's hard to control your pain or you need more pain medicine, please call your surgeon's office.  Questions?   If you have any questions for your care team, list them here: _________________________________________________________________________________________________________________________________________________________________________ ____________________________________ ____________________________________ ____________________________________  For informational purposes only. Not to replace the advice of your health care provider. Copyright   2003, 2019 [facility]. All rights reserved. Clinically reviewed by Rhonda Mcmahon MD. [employer] [id number] - REV 12/22.

## 2023-12-23 LAB — BACTERIA UR CULT: NO GROWTH

## 2024-01-05 ENCOUNTER — ANESTHESIA EVENT (OUTPATIENT)
Dept: SURGERY | Facility: HOSPITAL | Age: 54
End: 2024-01-05
Payer: COMMERCIAL

## 2024-01-08 ENCOUNTER — HOSPITAL ENCOUNTER (INPATIENT)
Facility: HOSPITAL | Age: 54
LOS: 2 days | Discharge: HOME | End: 2024-01-10
Attending: COLON & RECTAL SURGERY | Admitting: COLON & RECTAL SURGERY
Payer: COMMERCIAL

## 2024-01-08 ENCOUNTER — DOCUMENTATION ONLY (OUTPATIENT)
Dept: OTHER | Facility: CLINIC | Age: 54
End: 2024-01-08

## 2024-01-08 ENCOUNTER — ANESTHESIA (OUTPATIENT)
Dept: SURGERY | Facility: HOSPITAL | Age: 54
End: 2024-01-08
Payer: COMMERCIAL

## 2024-01-08 PROBLEM — K63.5 COLON POLYP: Status: ACTIVE | Noted: 2024-01-08

## 2024-01-08 LAB
ABO/RH(D): NORMAL
ANION GAP SERPL CALCULATED.3IONS-SCNC: 13 MMOL/L (ref 7–15)
ANTIBODY SCREEN: NEGATIVE
BUN SERPL-MCNC: 15 MG/DL (ref 6–20)
CALCIUM SERPL-MCNC: 8.5 MG/DL (ref 8.6–10)
CHLORIDE SERPL-SCNC: 103 MMOL/L (ref 98–107)
CREAT SERPL-MCNC: 0.92 MG/DL (ref 0.67–1.17)
CREAT SERPL-MCNC: 0.95 MG/DL (ref 0.67–1.17)
DEPRECATED HCO3 PLAS-SCNC: 20 MMOL/L (ref 22–29)
EGFRCR SERPLBLD CKD-EPI 2021: >90 ML/MIN/1.73M2
EGFRCR SERPLBLD CKD-EPI 2021: >90 ML/MIN/1.73M2
GLUCOSE BLDC GLUCOMTR-MCNC: 112 MG/DL (ref 70–99)
GLUCOSE SERPL-MCNC: 163 MG/DL (ref 70–99)
HBV SURFACE AG SERPL QL IA: NONREACTIVE
HGB BLD-MCNC: 16.9 G/DL (ref 13.3–17.7)
HIV 1+2 AB+HIV1 P24 AG SERPL QL IA: NONREACTIVE
HIV 1+2 AB+HIV1P24 AG SERPLBLD IA.RAPID: NON REACTIVE
HIV 1+2 AB+HIV1P24 AG SERPLBLD IA.RAPID: NON REACTIVE
HIV INTERPRETATION: NORMAL
POTASSIUM SERPL-SCNC: 4.7 MMOL/L (ref 3.4–5.3)
SODIUM SERPL-SCNC: 136 MMOL/L (ref 135–145)
SPECIMEN EXPIRATION DATE: NORMAL

## 2024-01-08 PROCEDURE — 36415 COLL VENOUS BLD VENIPUNCTURE: CPT

## 2024-01-08 PROCEDURE — 999N000141 HC STATISTIC PRE-PROCEDURE NURSING ASSESSMENT: Performed by: COLON & RECTAL SURGERY

## 2024-01-08 PROCEDURE — 258N000003 HC RX IP 258 OP 636: Performed by: COLON & RECTAL SURGERY

## 2024-01-08 PROCEDURE — 370N000017 HC ANESTHESIA TECHNICAL FEE, PER MIN: Performed by: COLON & RECTAL SURGERY

## 2024-01-08 PROCEDURE — 272N000004 HC RX 272: Performed by: COLON & RECTAL SURGERY

## 2024-01-08 PROCEDURE — 258N000003 HC RX IP 258 OP 636

## 2024-01-08 PROCEDURE — 250N000013 HC RX MED GY IP 250 OP 250 PS 637: Performed by: STUDENT IN AN ORGANIZED HEALTH CARE EDUCATION/TRAINING PROGRAM

## 2024-01-08 PROCEDURE — 250N000011 HC RX IP 250 OP 636: Performed by: ANESTHESIOLOGY

## 2024-01-08 PROCEDURE — 272N000001 HC OR GENERAL SUPPLY STERILE: Performed by: COLON & RECTAL SURGERY

## 2024-01-08 PROCEDURE — 710N000009 HC RECOVERY PHASE 1, LEVEL 1, PER MIN: Performed by: COLON & RECTAL SURGERY

## 2024-01-08 PROCEDURE — 250N000011 HC RX IP 250 OP 636: Performed by: STUDENT IN AN ORGANIZED HEALTH CARE EDUCATION/TRAINING PROGRAM

## 2024-01-08 PROCEDURE — 258N000003 HC RX IP 258 OP 636: Performed by: ANESTHESIOLOGY

## 2024-01-08 PROCEDURE — 360N000080 HC SURGERY LEVEL 7, PER MIN: Performed by: COLON & RECTAL SURGERY

## 2024-01-08 PROCEDURE — 80048 BASIC METABOLIC PNL TOTAL CA: CPT

## 2024-01-08 PROCEDURE — 120N000001 HC R&B MED SURG/OB

## 2024-01-08 PROCEDURE — 999N000104 HC STATISTIC NO CHARGE: Performed by: STUDENT IN AN ORGANIZED HEALTH CARE EDUCATION/TRAINING PROGRAM

## 2024-01-08 PROCEDURE — 85018 HEMOGLOBIN: CPT | Performed by: STUDENT IN AN ORGANIZED HEALTH CARE EDUCATION/TRAINING PROGRAM

## 2024-01-08 PROCEDURE — 86900 BLOOD TYPING SEROLOGIC ABO: CPT | Performed by: STUDENT IN AN ORGANIZED HEALTH CARE EDUCATION/TRAINING PROGRAM

## 2024-01-08 PROCEDURE — 82565 ASSAY OF CREATININE: CPT

## 2024-01-08 PROCEDURE — C9290 INJ, BUPIVACAINE LIPOSOME: HCPCS | Performed by: STUDENT IN AN ORGANIZED HEALTH CARE EDUCATION/TRAINING PROGRAM

## 2024-01-08 PROCEDURE — 83735 ASSAY OF MAGNESIUM: CPT | Performed by: COLON & RECTAL SURGERY

## 2024-01-08 PROCEDURE — 88309 TISSUE EXAM BY PATHOLOGIST: CPT | Mod: 26 | Performed by: PATHOLOGY

## 2024-01-08 PROCEDURE — 250N000011 HC RX IP 250 OP 636

## 2024-01-08 PROCEDURE — 250N000009 HC RX 250

## 2024-01-08 PROCEDURE — 250N000013 HC RX MED GY IP 250 OP 250 PS 637

## 2024-01-08 PROCEDURE — 36415 COLL VENOUS BLD VENIPUNCTURE: CPT | Performed by: STUDENT IN AN ORGANIZED HEALTH CARE EDUCATION/TRAINING PROGRAM

## 2024-01-08 PROCEDURE — 250N000025 HC SEVOFLURANE, PER MIN: Performed by: COLON & RECTAL SURGERY

## 2024-01-08 PROCEDURE — 88309 TISSUE EXAM BY PATHOLOGIST: CPT | Mod: TC | Performed by: COLON & RECTAL SURGERY

## 2024-01-08 RX ORDER — LIDOCAINE 40 MG/G
CREAM TOPICAL
Status: DISCONTINUED | OUTPATIENT
Start: 2024-01-08 | End: 2024-01-08 | Stop reason: HOSPADM

## 2024-01-08 RX ORDER — FENTANYL CITRATE 50 UG/ML
INJECTION, SOLUTION INTRAMUSCULAR; INTRAVENOUS PRN
Status: DISCONTINUED | OUTPATIENT
Start: 2024-01-08 | End: 2024-01-08

## 2024-01-08 RX ORDER — ONDANSETRON 2 MG/ML
4 INJECTION INTRAMUSCULAR; INTRAVENOUS EVERY 30 MIN PRN
Status: DISCONTINUED | OUTPATIENT
Start: 2024-01-08 | End: 2024-01-08 | Stop reason: HOSPADM

## 2024-01-08 RX ORDER — LIDOCAINE HYDROCHLORIDE 10 MG/ML
INJECTION, SOLUTION INFILTRATION; PERINEURAL PRN
Status: DISCONTINUED | OUTPATIENT
Start: 2024-01-08 | End: 2024-01-08

## 2024-01-08 RX ORDER — ACETAMINOPHEN 325 MG/1
650 TABLET ORAL EVERY 4 HOURS PRN
Status: DISCONTINUED | OUTPATIENT
Start: 2024-01-11 | End: 2024-01-10 | Stop reason: HOSPADM

## 2024-01-08 RX ORDER — OXYCODONE HYDROCHLORIDE 5 MG/1
5 TABLET ORAL
Status: COMPLETED | OUTPATIENT
Start: 2024-01-08 | End: 2024-01-08

## 2024-01-08 RX ORDER — MAGNESIUM SULFATE 4 G/50ML
4 INJECTION INTRAVENOUS ONCE
Status: COMPLETED | OUTPATIENT
Start: 2024-01-08 | End: 2024-01-08

## 2024-01-08 RX ORDER — SODIUM CHLORIDE, SODIUM LACTATE, POTASSIUM CHLORIDE, CALCIUM CHLORIDE 600; 310; 30; 20 MG/100ML; MG/100ML; MG/100ML; MG/100ML
INJECTION, SOLUTION INTRAVENOUS CONTINUOUS
Status: DISCONTINUED | OUTPATIENT
Start: 2024-01-08 | End: 2024-01-10 | Stop reason: HOSPADM

## 2024-01-08 RX ORDER — HYDROMORPHONE HCL IN WATER/PF 6 MG/30 ML
0.2 PATIENT CONTROLLED ANALGESIA SYRINGE INTRAVENOUS
Status: DISCONTINUED | OUTPATIENT
Start: 2024-01-08 | End: 2024-01-10 | Stop reason: HOSPADM

## 2024-01-08 RX ORDER — GABAPENTIN 100 MG/1
100 CAPSULE ORAL 3 TIMES DAILY
Status: DISCONTINUED | OUTPATIENT
Start: 2024-01-08 | End: 2024-01-10 | Stop reason: HOSPADM

## 2024-01-08 RX ORDER — BUPIVACAINE HYDROCHLORIDE 2.5 MG/ML
INJECTION, SOLUTION EPIDURAL; INFILTRATION; INTRACAUDAL
Status: COMPLETED | OUTPATIENT
Start: 2024-01-08 | End: 2024-01-08

## 2024-01-08 RX ORDER — SODIUM CHLORIDE, SODIUM LACTATE, POTASSIUM CHLORIDE, CALCIUM CHLORIDE 600; 310; 30; 20 MG/100ML; MG/100ML; MG/100ML; MG/100ML
INJECTION, SOLUTION INTRAVENOUS CONTINUOUS
Status: DISCONTINUED | OUTPATIENT
Start: 2024-01-08 | End: 2024-01-08 | Stop reason: HOSPADM

## 2024-01-08 RX ORDER — NALOXONE HYDROCHLORIDE 0.4 MG/ML
0.4 INJECTION, SOLUTION INTRAMUSCULAR; INTRAVENOUS; SUBCUTANEOUS
Status: DISCONTINUED | OUTPATIENT
Start: 2024-01-08 | End: 2024-01-10 | Stop reason: HOSPADM

## 2024-01-08 RX ORDER — DEXMEDETOMIDINE HYDROCHLORIDE 4 UG/ML
INJECTION, SOLUTION INTRAVENOUS
Status: DISCONTINUED
Start: 2024-01-08 | End: 2024-01-08 | Stop reason: HOSPADM

## 2024-01-08 RX ORDER — CEFAZOLIN SODIUM/WATER 2 G/20 ML
2 SYRINGE (ML) INTRAVENOUS
Status: COMPLETED | OUTPATIENT
Start: 2024-01-08 | End: 2024-01-08

## 2024-01-08 RX ORDER — CEFAZOLIN SODIUM/WATER 2 G/20 ML
2 SYRINGE (ML) INTRAVENOUS SEE ADMIN INSTRUCTIONS
Status: DISCONTINUED | OUTPATIENT
Start: 2024-01-08 | End: 2024-01-08 | Stop reason: HOSPADM

## 2024-01-08 RX ORDER — ENOXAPARIN SODIUM 100 MG/ML
40 INJECTION SUBCUTANEOUS EVERY 24 HOURS
Status: DISCONTINUED | OUTPATIENT
Start: 2024-01-09 | End: 2024-01-10 | Stop reason: HOSPADM

## 2024-01-08 RX ORDER — DIPHENHYDRAMINE HYDROCHLORIDE 50 MG/ML
25 INJECTION INTRAMUSCULAR; INTRAVENOUS EVERY 6 HOURS PRN
Status: DISCONTINUED | OUTPATIENT
Start: 2024-01-08 | End: 2024-01-10 | Stop reason: HOSPADM

## 2024-01-08 RX ORDER — ONDANSETRON 4 MG/1
4 TABLET, ORALLY DISINTEGRATING ORAL EVERY 30 MIN PRN
Status: DISCONTINUED | OUTPATIENT
Start: 2024-01-08 | End: 2024-01-08 | Stop reason: HOSPADM

## 2024-01-08 RX ORDER — INDOCYANINE GREEN AND WATER 25 MG
KIT INJECTION PRN
Status: DISCONTINUED | OUTPATIENT
Start: 2024-01-08 | End: 2024-01-08

## 2024-01-08 RX ORDER — ONDANSETRON 2 MG/ML
4 INJECTION INTRAMUSCULAR; INTRAVENOUS EVERY 6 HOURS PRN
Status: DISCONTINUED | OUTPATIENT
Start: 2024-01-08 | End: 2024-01-10 | Stop reason: HOSPADM

## 2024-01-08 RX ORDER — LIDOCAINE 40 MG/G
CREAM TOPICAL
Status: DISCONTINUED | OUTPATIENT
Start: 2024-01-08 | End: 2024-01-10 | Stop reason: HOSPADM

## 2024-01-08 RX ORDER — EPHEDRINE SULFATE 50 MG/ML
INJECTION, SOLUTION INTRAMUSCULAR; INTRAVENOUS; SUBCUTANEOUS PRN
Status: DISCONTINUED | OUTPATIENT
Start: 2024-01-08 | End: 2024-01-08

## 2024-01-08 RX ORDER — ONDANSETRON 2 MG/ML
4 INJECTION INTRAMUSCULAR; INTRAVENOUS ONCE
Status: COMPLETED | OUTPATIENT
Start: 2024-01-08 | End: 2024-01-08

## 2024-01-08 RX ORDER — SODIUM CHLORIDE, SODIUM LACTATE, POTASSIUM CHLORIDE, CALCIUM CHLORIDE 600; 310; 30; 20 MG/100ML; MG/100ML; MG/100ML; MG/100ML
INJECTION, SOLUTION INTRAVENOUS CONTINUOUS PRN
Status: DISCONTINUED | OUTPATIENT
Start: 2024-01-08 | End: 2024-01-08

## 2024-01-08 RX ORDER — LANOLIN ALCOHOL/MO/W.PET/CERES
3 CREAM (GRAM) TOPICAL
Status: DISCONTINUED | OUTPATIENT
Start: 2024-01-08 | End: 2024-01-10 | Stop reason: HOSPADM

## 2024-01-08 RX ORDER — NALOXONE HYDROCHLORIDE 0.4 MG/ML
0.2 INJECTION, SOLUTION INTRAMUSCULAR; INTRAVENOUS; SUBCUTANEOUS
Status: DISCONTINUED | OUTPATIENT
Start: 2024-01-08 | End: 2024-01-10 | Stop reason: HOSPADM

## 2024-01-08 RX ORDER — CALCIUM CARBONATE 500 MG/1
500 TABLET, CHEWABLE ORAL 2 TIMES DAILY PRN
Status: DISCONTINUED | OUTPATIENT
Start: 2024-01-08 | End: 2024-01-10 | Stop reason: HOSPADM

## 2024-01-08 RX ORDER — ACETAMINOPHEN 325 MG/1
975 TABLET ORAL EVERY 8 HOURS
Status: DISCONTINUED | OUTPATIENT
Start: 2024-01-08 | End: 2024-01-10 | Stop reason: HOSPADM

## 2024-01-08 RX ORDER — METRONIDAZOLE 500 MG/100ML
500 INJECTION, SOLUTION INTRAVENOUS
Status: COMPLETED | OUTPATIENT
Start: 2024-01-08 | End: 2024-01-08

## 2024-01-08 RX ORDER — DEXAMETHASONE SODIUM PHOSPHATE 10 MG/ML
INJECTION, SOLUTION INTRAMUSCULAR; INTRAVENOUS PRN
Status: DISCONTINUED | OUTPATIENT
Start: 2024-01-08 | End: 2024-01-08

## 2024-01-08 RX ORDER — LABETALOL HYDROCHLORIDE 5 MG/ML
10 INJECTION, SOLUTION INTRAVENOUS
Status: DISCONTINUED | OUTPATIENT
Start: 2024-01-08 | End: 2024-01-08 | Stop reason: HOSPADM

## 2024-01-08 RX ORDER — ONDANSETRON 4 MG/1
4 TABLET, ORALLY DISINTEGRATING ORAL EVERY 6 HOURS PRN
Status: DISCONTINUED | OUTPATIENT
Start: 2024-01-08 | End: 2024-01-10 | Stop reason: HOSPADM

## 2024-01-08 RX ORDER — ACETAMINOPHEN 325 MG/1
975 TABLET ORAL ONCE
Status: COMPLETED | OUTPATIENT
Start: 2024-01-08 | End: 2024-01-08

## 2024-01-08 RX ORDER — SODIUM CHLORIDE, SODIUM LACTATE, POTASSIUM CHLORIDE, AND CALCIUM CHLORIDE .6; .31; .03; .02 G/100ML; G/100ML; G/100ML; G/100ML
IRRIGANT IRRIGATION PRN
Status: DISCONTINUED | OUTPATIENT
Start: 2024-01-08 | End: 2024-01-08 | Stop reason: HOSPADM

## 2024-01-08 RX ORDER — PROPOFOL 10 MG/ML
INJECTION, EMULSION INTRAVENOUS PRN
Status: DISCONTINUED | OUTPATIENT
Start: 2024-01-08 | End: 2024-01-08

## 2024-01-08 RX ORDER — FENTANYL CITRATE 50 UG/ML
50 INJECTION, SOLUTION INTRAMUSCULAR; INTRAVENOUS EVERY 5 MIN PRN
Status: DISCONTINUED | OUTPATIENT
Start: 2024-01-08 | End: 2024-01-08 | Stop reason: HOSPADM

## 2024-01-08 RX ORDER — HEPARIN SODIUM 5000 [USP'U]/.5ML
5000 INJECTION, SOLUTION INTRAVENOUS; SUBCUTANEOUS
Status: COMPLETED | OUTPATIENT
Start: 2024-01-08 | End: 2024-01-08

## 2024-01-08 RX ORDER — FENTANYL CITRATE 50 UG/ML
25 INJECTION, SOLUTION INTRAMUSCULAR; INTRAVENOUS EVERY 5 MIN PRN
Status: DISCONTINUED | OUTPATIENT
Start: 2024-01-08 | End: 2024-01-08 | Stop reason: HOSPADM

## 2024-01-08 RX ORDER — ONDANSETRON 2 MG/ML
INJECTION INTRAMUSCULAR; INTRAVENOUS PRN
Status: DISCONTINUED | OUTPATIENT
Start: 2024-01-08 | End: 2024-01-08

## 2024-01-08 RX ORDER — HYDROMORPHONE HCL IN WATER/PF 6 MG/30 ML
0.4 PATIENT CONTROLLED ANALGESIA SYRINGE INTRAVENOUS
Status: DISCONTINUED | OUTPATIENT
Start: 2024-01-08 | End: 2024-01-10 | Stop reason: HOSPADM

## 2024-01-08 RX ORDER — KETAMINE HYDROCHLORIDE 10 MG/ML
INJECTION INTRAMUSCULAR; INTRAVENOUS PRN
Status: DISCONTINUED | OUTPATIENT
Start: 2024-01-08 | End: 2024-01-08

## 2024-01-08 RX ADMIN — SODIUM CHLORIDE, POTASSIUM CHLORIDE, SODIUM LACTATE AND CALCIUM CHLORIDE: 600; 310; 30; 20 INJECTION, SOLUTION INTRAVENOUS at 12:24

## 2024-01-08 RX ADMIN — ROCURONIUM BROMIDE 30 MG: 50 INJECTION, SOLUTION INTRAVENOUS at 08:54

## 2024-01-08 RX ADMIN — HYDROMORPHONE HYDROCHLORIDE 0.4 MG: 0.2 INJECTION, SOLUTION INTRAMUSCULAR; INTRAVENOUS; SUBCUTANEOUS at 18:37

## 2024-01-08 RX ADMIN — ROCURONIUM BROMIDE 50 MG: 50 INJECTION, SOLUTION INTRAVENOUS at 07:49

## 2024-01-08 RX ADMIN — KETAMINE HYDROCHLORIDE 10 MG: 10 INJECTION INTRAMUSCULAR; INTRAVENOUS at 10:49

## 2024-01-08 RX ADMIN — SODIUM CHLORIDE, POTASSIUM CHLORIDE, SODIUM LACTATE AND CALCIUM CHLORIDE: 600; 310; 30; 20 INJECTION, SOLUTION INTRAVENOUS at 16:22

## 2024-01-08 RX ADMIN — FENTANYL CITRATE 50 MCG: 50 INJECTION, SOLUTION INTRAMUSCULAR; INTRAVENOUS at 14:01

## 2024-01-08 RX ADMIN — HYDROMORPHONE HYDROCHLORIDE 0.4 MG: 1 INJECTION, SOLUTION INTRAMUSCULAR; INTRAVENOUS; SUBCUTANEOUS at 14:55

## 2024-01-08 RX ADMIN — SODIUM CHLORIDE, POTASSIUM CHLORIDE, SODIUM LACTATE AND CALCIUM CHLORIDE: 600; 310; 30; 20 INJECTION, SOLUTION INTRAVENOUS at 07:35

## 2024-01-08 RX ADMIN — FENTANYL CITRATE 50 MCG: 50 INJECTION, SOLUTION INTRAMUSCULAR; INTRAVENOUS at 14:11

## 2024-01-08 RX ADMIN — PROPOFOL 200 MG: 10 INJECTION, EMULSION INTRAVENOUS at 07:48

## 2024-01-08 RX ADMIN — ROCURONIUM BROMIDE 10 MG: 50 INJECTION, SOLUTION INTRAVENOUS at 10:48

## 2024-01-08 RX ADMIN — ROCURONIUM BROMIDE 30 MG: 50 INJECTION, SOLUTION INTRAVENOUS at 11:37

## 2024-01-08 RX ADMIN — ROCURONIUM BROMIDE 20 MG: 50 INJECTION, SOLUTION INTRAVENOUS at 09:32

## 2024-01-08 RX ADMIN — Medication 2 G: at 08:11

## 2024-01-08 RX ADMIN — SODIUM CHLORIDE, POTASSIUM CHLORIDE, SODIUM LACTATE AND CALCIUM CHLORIDE: 600; 310; 30; 20 INJECTION, SOLUTION INTRAVENOUS at 06:49

## 2024-01-08 RX ADMIN — BUPIVACAINE HYDROCHLORIDE 20 ML: 2.5 INJECTION, SOLUTION EPIDURAL; INFILTRATION; INTRACAUDAL; PERINEURAL at 08:00

## 2024-01-08 RX ADMIN — ROCURONIUM BROMIDE 20 MG: 50 INJECTION, SOLUTION INTRAVENOUS at 08:26

## 2024-01-08 RX ADMIN — HEPARIN SODIUM 5000 UNITS: 10000 INJECTION, SOLUTION INTRAVENOUS; SUBCUTANEOUS at 07:10

## 2024-01-08 RX ADMIN — BUPIVACAINE 20 ML: 13.3 INJECTION, SUSPENSION, LIPOSOMAL INFILTRATION at 08:00

## 2024-01-08 RX ADMIN — GABAPENTIN 100 MG: 100 CAPSULE ORAL at 21:21

## 2024-01-08 RX ADMIN — ACETAMINOPHEN 975 MG: 325 TABLET ORAL at 18:36

## 2024-01-08 RX ADMIN — Medication 2 G: at 12:11

## 2024-01-08 RX ADMIN — OXYCODONE HYDROCHLORIDE 5 MG: 5 TABLET ORAL at 16:22

## 2024-01-08 RX ADMIN — ONDANSETRON 4 MG: 2 INJECTION INTRAMUSCULAR; INTRAVENOUS at 07:08

## 2024-01-08 RX ADMIN — INDOCYANINE GREEN AND WATER 5 MG: KIT at 10:26

## 2024-01-08 RX ADMIN — PHENYLEPHRINE HYDROCHLORIDE 50 MCG: 10 INJECTION INTRAVENOUS at 08:28

## 2024-01-08 RX ADMIN — LIDOCAINE HYDROCHLORIDE 5 ML: 10 INJECTION, SOLUTION INFILTRATION; PERINEURAL at 07:48

## 2024-01-08 RX ADMIN — DEXAMETHASONE SODIUM PHOSPHATE 10 MG: 10 INJECTION, SOLUTION INTRAMUSCULAR; INTRAVENOUS at 07:48

## 2024-01-08 RX ADMIN — KETAMINE HYDROCHLORIDE 20 MG: 10 INJECTION INTRAMUSCULAR; INTRAVENOUS at 07:48

## 2024-01-08 RX ADMIN — HYDROMORPHONE HYDROCHLORIDE 0.4 MG: 1 INJECTION, SOLUTION INTRAMUSCULAR; INTRAVENOUS; SUBCUTANEOUS at 15:08

## 2024-01-08 RX ADMIN — HYDROMORPHONE HYDROCHLORIDE 0.25 MG: 1 INJECTION, SOLUTION INTRAMUSCULAR; INTRAVENOUS; SUBCUTANEOUS at 09:32

## 2024-01-08 RX ADMIN — SUGAMMADEX 300 MG: 100 INJECTION, SOLUTION INTRAVENOUS at 12:34

## 2024-01-08 RX ADMIN — MAGNESIUM SULFATE HEPTAHYDRATE 4 G: 80 INJECTION, SOLUTION INTRAVENOUS at 06:57

## 2024-01-08 RX ADMIN — HYDROMORPHONE HYDROCHLORIDE 0.2 MG: 1 INJECTION, SOLUTION INTRAMUSCULAR; INTRAVENOUS; SUBCUTANEOUS at 15:17

## 2024-01-08 RX ADMIN — MIDAZOLAM 2 MG: 1 INJECTION INTRAMUSCULAR; INTRAVENOUS at 07:35

## 2024-01-08 RX ADMIN — Medication 10 MG: at 08:28

## 2024-01-08 RX ADMIN — HYDROMORPHONE HYDROCHLORIDE 0.4 MG: 0.2 INJECTION, SOLUTION INTRAMUSCULAR; INTRAVENOUS; SUBCUTANEOUS at 21:21

## 2024-01-08 RX ADMIN — KETAMINE HYDROCHLORIDE 10 MG: 10 INJECTION INTRAMUSCULAR; INTRAVENOUS at 09:57

## 2024-01-08 RX ADMIN — ROCURONIUM BROMIDE 20 MG: 50 INJECTION, SOLUTION INTRAVENOUS at 11:16

## 2024-01-08 RX ADMIN — Medication 10 MG: at 08:49

## 2024-01-08 RX ADMIN — HYDROMORPHONE HYDROCHLORIDE 0.5 MG: 1 INJECTION, SOLUTION INTRAMUSCULAR; INTRAVENOUS; SUBCUTANEOUS at 08:26

## 2024-01-08 RX ADMIN — ONDANSETRON 4 MG: 2 INJECTION INTRAMUSCULAR; INTRAVENOUS at 12:19

## 2024-01-08 RX ADMIN — FENTANYL CITRATE 100 MCG: 50 INJECTION INTRAMUSCULAR; INTRAVENOUS at 07:48

## 2024-01-08 RX ADMIN — KETAMINE HYDROCHLORIDE 10 MG: 10 INJECTION INTRAMUSCULAR; INTRAVENOUS at 08:49

## 2024-01-08 RX ADMIN — METRONIDAZOLE 500 MG: 500 INJECTION, SOLUTION INTRAVENOUS at 06:57

## 2024-01-08 RX ADMIN — SODIUM CHLORIDE, POTASSIUM CHLORIDE, SODIUM LACTATE AND CALCIUM CHLORIDE: 600; 310; 30; 20 INJECTION, SOLUTION INTRAVENOUS at 10:51

## 2024-01-08 RX ADMIN — HYDROMORPHONE HYDROCHLORIDE 0.25 MG: 1 INJECTION, SOLUTION INTRAMUSCULAR; INTRAVENOUS; SUBCUTANEOUS at 10:48

## 2024-01-08 RX ADMIN — ROCURONIUM BROMIDE 20 MG: 50 INJECTION, SOLUTION INTRAVENOUS at 10:21

## 2024-01-08 RX ADMIN — ACETAMINOPHEN 975 MG: 325 TABLET ORAL at 06:56

## 2024-01-08 ASSESSMENT — ACTIVITIES OF DAILY LIVING (ADL)
ADLS_ACUITY_SCORE: 20
ADLS_ACUITY_SCORE: 20
ADLS_ACUITY_SCORE: 22
ADLS_ACUITY_SCORE: 20
ADLS_ACUITY_SCORE: 22
ADLS_ACUITY_SCORE: 20
ADLS_ACUITY_SCORE: 20
ADLS_ACUITY_SCORE: 35
ADLS_ACUITY_SCORE: 20

## 2024-01-08 NOTE — ANESTHESIA PROCEDURE NOTES
"TAP Procedure Note    Pre-Procedure   Staff -        Anesthesiologist:  Nick Sun MD       Performed By: anesthesiologist       Location: OR       Pre-Anesthestic Checklist: patient identified, IV checked, site marked, risks and benefits discussed, informed consent, monitors and equipment checked, pre-op evaluation, at physician/surgeon's request and post-op pain management  Timeout:       Correct Patient: Yes        Correct Procedure: Yes        Correct Site: Yes        Correct Position: Yes        Correct Laterality: Yes        Site Marked: Yes  Procedure Documentation  Procedure: TAP       Diagnosis: POST OPERATIVE PAIN       Laterality: bilateral       Patient Position: supine       Patient Prep/Sterile Barriers: sterile gloves, mask       Skin prep: Chloraprep       Needle Type: short bevel       Needle Gauge: 21.        Needle Length (millimeters): 110        Ultrasound guided       1. Ultrasound was used to identify targeted nerve, plexus, vascular marker, or fascial plane and place a needle adjacent to it in real-time.       2. Ultrasound was used to visualize the spread of anesthetic in close proximity to the above referenced structure.       3. A permanent image is entered into the patient's record.    Assessment/Narrative         The placement was negative for: blood aspirated, painful injection and site bleeding       Paresthesias: No.       Bolus given via needle..        Secured via.        Insertion/Infusion Method: Single Shot       Complications: none       Injection made incrementally with aspirations every 5 mL.    Medication(s) Administered   Bupivacaine 0.25% PF (Infiltration) - Infiltration   20 mL - 1/8/2024 8:00:00 AM  Bupivacaine liposome (Exparel) 1.3% LA inj susp (Infiltration) - Infiltration   20 mL - 1/8/2024 8:00:00 AM    FOR [facility] ([facility]) ONLY:   Pain Team Contact information: please page the Pain Team Via [employer]. Search \"Pain\". During daytime hours, please page " the attending first. At night please page the resident first.

## 2024-01-08 NOTE — ANESTHESIA PREPROCEDURE EVALUATION
Anesthesia Pre-Procedure Evaluation    Patient: Yeison Grijalva   MRN: [id number] : 1970        Procedure : Procedure(s):  ROBOTIC COLECTOMY RIGHT          Past Medical History:   Diagnosis Date    Acute cystitis without hematuria     Colonic mass     Fatty liver       No past surgical history on file.   No Known Allergies   Social History     Tobacco Use    Smoking status: Never    Smokeless tobacco: Never   Substance Use Topics    Alcohol use: Not on file      Wt Readings from Last 1 Encounters:   23 112.5 kg (248 lb)        Anesthesia Evaluation   Pt has had prior anesthetic.     No history of anesthetic complications       ROS/MED HX  ENT/Pulmonary:  - neg pulmonary ROS     Neurologic:  - neg neurologic ROS     Cardiovascular:  - neg cardiovascular ROS  (-) murmur   METS/Exercise Tolerance:     Hematologic:  - neg hematologic  ROS     Musculoskeletal:  - neg musculoskeletal ROS     GI/Hepatic: Comment: Colon mass    (+)             liver disease (Fatty liver),       Renal/Genitourinary:       Endo:     (+)               Obesity,       Psychiatric/Substance Use:  - neg psychiatric ROS     Infectious Disease:       Malignancy:       Other:  - neg other ROS          Physical Exam    Airway  airway exam normal      Mallampati: II   TM distance: > 3 FB   Neck ROM: full   Mouth opening: > 3 cm    Respiratory Devices and Support         Dental       (+) Modest Abnormalities - crowns, retainers, 1 or 2 missing teeth      Cardiovascular   cardiovascular exam normal       Rhythm and rate: regular and normal (-) no murmur    Pulmonary   pulmonary exam normal        breath sounds clear to auscultation           OUTSIDE LABS:  CBC:   Lab Results   Component Value Date    WBC 6.9 11/10/2023    HGB 15.6 11/10/2023    HCT 46.8 11/10/2023     11/10/2023     BMP:   Lab Results   Component Value Date     11/10/2023    POTASSIUM 4.4 11/10/2023    CHLORIDE 103 11/10/2023    CO2 26 11/10/2023    BUN  "15.0 11/10/2023    CR 0.83 11/10/2023    GLC 92 11/10/2023     COAGS: No results found for: \"PTT\", \"INR\", \"FIBR\"  POC: No results found for: \"BGM\", \"HCG\", \"HCGS\"  HEPATIC:   Lab Results   Component Value Date    ALBUMIN 4.8 11/10/2023    PROTTOTAL 7.8 11/10/2023    ALT 37 11/10/2023    AST 24 11/10/2023    ALKPHOS 68 11/10/2023    BILITOTAL 0.5 11/10/2023     OTHER:   Lab Results   Component Value Date    A1C 5.6 11/10/2023    CHAYO 9.5 11/10/2023       Anesthesia Plan    ASA Status:  3    NPO Status:  NPO Appropriate    Anesthesia Type: General.     - Airway: ETT   Induction: Intravenous.   Maintenance: Balanced.   Techniques and Equipment:       - Drips/Meds: Ketamine, Dexmed. bolus     Consents    Anesthesia Plan(s) and associated risks, benefits, and realistic alternatives discussed. Questions answered and patient/representative(s) expressed understanding.     - Discussed: Risks, Benefits and Alternatives for BOTH SEDATION and the PROCEDURE were discussed     - Discussed with:  Patient            Postoperative Care    Pain management: IV analgesics, Oral pain medications, Peripheral nerve block (Single Shot).   PONV prophylaxis: Ondansetron (or other 5HT-3), Dexamethasone or Solumedrol     Comments:    Other Comments: TAP post induction           Nick Sun MD    I have reviewed the pertinent notes and labs in the chart from the past 30 days and (re)examined the patient.  Any updates or changes from those notes are reflected in this note.              # Obesity: Estimated body mass index is 33.15 kg/m  as calculated from the following:    Height as of 12/11/23: 1.842 m (6' 0.52\").    Weight as of 12/22/23: 112.5 kg (248 lb).      "

## 2024-01-08 NOTE — ANESTHESIA CARE TRANSFER NOTE
Patient: Yeison Grijalva    Procedure: Procedure(s):  ROBOTIC COLECTOMY RIGHT  HERNIORRHAPHY, UMBILICAL, OPEN       Diagnosis: Colon polyp [K63.5]  Diagnosis Additional Information: No value filed.    Anesthesia Type:   General     Note:    Oropharynx: spontaneously breathing and oropharynx clear of all foreign objects  Level of Consciousness: awake and drowsy  Oxygen Supplementation: face mask  Level of Supplemental Oxygen (L/min / FiO2): 8  Independent Airway: airway patency satisfactory and stable  Dentition: dentition unchanged  Vital Signs Stable: post-procedure vital signs reviewed and stable  Report to RN Given: handoff report given  Patient transferred to: PACU    Handoff Report: Identifed the Patient, Identified the Reponsible Provider, Reviewed the pertinent medical history, Discussed the surgical course, Reviewed Intra-OP anesthesia mangement and issues during anesthesia, Set expectations for post-procedure period and Allowed opportunity for questions and acknowledgement of understanding      Vitals:  Vitals Value Taken Time   /85 01/08/24 1302   Temp     Pulse 90 01/08/24 1304   Resp 18 01/08/24 1304   SpO2 97 % 01/08/24 1304   Vitals shown include unfiled device data.    Electronically Signed By: RAMONA Garrison CRNA  January 8, 2024  1:05 PM

## 2024-01-08 NOTE — ANESTHESIA PROCEDURE NOTES
Airway       Patient location during procedure: OR       Procedure Start/Stop Times: 1/8/2024 7:54 AM  Staff -        Anesthesiologist:  Nick Sun MD       CRNA: Yovany Maradiaga APRN CRNA       Performed By: CRNA  Consent for Airway        Urgency: elective  Indications and Patient Condition       Indications for airway management: charleen-procedural       Induction type:intravenous       Mask difficulty assessment: 1 - vent by mask    Final Airway Details       Final airway type: endotracheal airway       Successful airway: ETT - single and Oral  Endotracheal Airway Details        ETT size (mm): 8.0       Cuffed: yes       Successful intubation technique: direct laryngoscopy       DL Blade Type: Granados 2       Grade View of Cords: 1       Adjucts: stylet       Position: Right       Measured from: gums/teeth       Secured at (cm): 24       Bite block used: None    Post intubation assessment        Placement verified by: capnometry, equal breath sounds and chest rise        Number of attempts at approach: 1       Number of other approaches attempted: 0       Secured with: tape       Ease of procedure: easy       Dentition: Intact and Unchanged    Medication(s) Administered   Medication Administration Time: 1/8/2024 7:54 AM

## 2024-01-08 NOTE — OR NURSING
1445: Dr. Sun called re: /83 (baseline 133/80). Pt states he is more comfortable now. Anticipate Dr. Sun to assess at bedside shortly.     Charge RN updated.     1630: Dr. Sun reviewed pt status et BP trends. Stated that he did not want pressure treated unless > 180 mm Hg systolic or pt symptomatic. Neither of these currently.     Katarzyna Rangel, RN

## 2024-01-08 NOTE — OP NOTE
COLORECTAL SURGERY OPERATIVE NOTE    Date of Service: 1/8/2024    Pre-Operative Diagnosis:   Right colon polyp  Umbilical hernia   BMI 32    Post-Operative Diagnosis:   Right colon polyp  Umbilical hernia  BMI 32    Procedure:   1. Robotic right colectomy (intra-corporeal anastomosis)  2. Umbilical hernia repair (note: this was a separate incision and not a part of the closure of the right colectomy)    Surgeon: Annamaria Menezes MD    Assistant: Calvin Koehler MD [facility] Colorectal Surgery Fellow    Second Assistant: Viv Briggs PA-C (Ms. Briggs required for retraction and creation of the anastomosis)    Anesthesia: General     Estimated Blood Loss: 20 ml    Bowel Prep: Mechanical and Antibiotic    Findings: No evidence of tumor within the peritoneum, pelvis, or liver. Intra-corporeal anastomosis. 1cm umbilical hernia repaired.     Indication: The patient is a 53 year old male with a history of an endoscopically unresectable polyp of the cecum.  We discussed the the procedure of robotic assisted sigmoid colectomy (possible open) and umbilical hernia repair (open/primary). Risks outlined include bleeding, infection (anastomotic leak <5%), and damage to surrounding structures including the small bowel and ureter. Please see my office note for the details of our discussion.      Specimen: Right colon    Operative Details: After informed consent was obtained, the patient was brought to the operating room and placed in supine position on the operating room table. They were given 5000 units of subcutaneous heparin in the prep-operative area. Sequential compression devices were placed on bilateral lower extremities prior to induction.  General anesthesia was induced without difficulty. TAP blocks were performed by anesthesia. IV antibiotics were administered per the ERAS protocol. A jarvis catheter was placed by the operating room nurse without difficulty. The patient's abdomen was sterilely prepped and  draped in usual fashion. A timeout was performed per protocol.     We began with a Veress needle entry in the left upper quadrant. This needle was passed on the first attempt. The entry pressure was 3mmHg. The peritoneal cavity was insufflated to 15mmHg. An 8mm trochar was placed in the left mid-abdomen. The 8mm 30 degree robotic camera was placed. There was no evidence of injury from the Veress needle.     4 more trocars were placed from the suprapubic position just to the left up to the left costal margin.  These were 3 robotic 8 mm trocars, a robotic 12 mm trocar, and an 8 mm AirSeal trocar.  There were a few adhesions from the omentum to the umbilical hernia as well as from the transverse colon to the anterior abdominal wall.  These were taken down using electrocautery and scissor.  There were no abnormalities within the peritoneal cavity.  The liver appeared normal.  The pelvis appeared normal.    Next, the patient was placed in mild Trendelenburg position with the left side down.  The robotic Xi system was docked.  I began by reflecting the small bowel to expose the ileocolic pedicle.  A window was created between the mesentery and the retroperitoneum.  We continued this until we identified the duodenum.  This was swept down and out of harm's way.  We continued our medial to lateral dissection until we were underneath the transverse colon in a cephalad position and up to the sidewall laterally.  From below, we were able to get through the hepatocolic ligament and then bring this out to the side performing our lateral dissection as well.  I then turned my attention to the ileocolic artery.  A window was created around the peritoneum exposing the bare area.  I confirmed again that the duodenum was down.  The artery was divided using the vessel sealer device sealing twice on the patient's side, twice on the specimen side, and twice in between.  The stump of the pedicle was ligated using an Endoloop.    We turned  our attention to the lateral side mobilizing the ileum and connecting with our prior dissection.  We then continued our dissection of the transverse colon until we had freed adequate length for our transection.  The mesentery was then divided at the terminal ileum as well as in the proximal transverse colon.  Both of these ends of bowel were then divided using a firing of a 60 mm blue load robotic sure form stapler.  At this point, the specimen was completely free and placed up over the liver.    I then set up for the anastomosis.  While the transverse colon reached without difficulty, the ileum did feel tethered to secondary to the foreshortened mesentery.  Therefore, the patient was placed into Trendelenburg position and to the small bowel mesentery was mobilized to the duodenum freeing it up is much as possible.  After this, the ileum reached the transverse colon without any difficulty.  There was however a small area of bleeding on the transverse colon side which was expected and unavoidable from the adhesions of the transverse colon to the anterior abdominal wall.  There was a very small amount of pulsatile bleeding here.  This was grasped using the fenestrated bipolar and cauterized.  As this was close to the bowel wall, I imbricated this area using 3-0 Vicryl sutures.  The small bowel and colon were set up in an isoperistaltic configuration.  A colotomy was made 8 cm from the end of the colonic staple line and 2 cm from the end of the small bowel staple line.  An anastomosis was created using a single firing of a TAJ 60 mm blue sure form stapler.  The anastomosis looked good intraluminally.  I then placed two 3-0 Vicryl stay sutures on the proximal and distal side of the anastomosis.  The common enterotomy was closed using a running 3 oh STRATAFIX suture in 2 layers.  As a did run a little short on length on the way back for the seromuscular layer, the final 2 cm of the anastomosis were imbricated using  interrupted 3-0 Vicryl sutures.  A 3-0 Vicryl crotch stitch was placed on each side.  This completed the anastomosis.    The small amount of blood was suctioned from the peritoneal cavity.  The omentum was brought down over the anastomosis.  The specimen was grasped.  I then rescrubbed, gowned, and gloved and returned to the operating room table.  The left upper quadrant incision used for the stapler was extended.  We did have to make this incision somewhat larger than usual secondary to the thickness of the mesentery as well as the size of the polyp itself.  A medium Adam wound protector was placed.  The specimen was exteriorized and passed off of the field.  I confirmed that there was a large polyp in the cecum.  The incision was then closed using both the posterior and anterior layers of the fascia with #1 running PDS.  Soft tissues were irrigated out and the skin sites were closed using 4-0 Monocryl and Dermabond.      Finally, an incision was made directly above the umbilicus.  The umbilical stalk was circumferentially dissected and then divided at its base.  There was a 1 cm hernia defect.  Previously this had contained incarcerated omentum.  The defect was closed using a single #1 PDS figure-of-eight suture.  The umbilical stalk was then tacked down using a 3-0 Vicryl suture and the skin was closed using 4-0 Monocryl.  The wound was dressed with Steri-Strips, gauze, and a Tegaderm.    At this point our planned procedure was completed. All sponge and instrument counts were correct x 2.    Complications: None    Disposition: Stable, extubated, to PACU    Annamaria Menezes MD, JESÚS  [facility]  Office: [phone]  1/8/2024 12:36 PM

## 2024-01-08 NOTE — ANESTHESIA POSTPROCEDURE EVALUATION
Patient: Yeison Grijalva    Procedure: Procedure(s):  ROBOTIC COLECTOMY RIGHT  HERNIORRHAPHY, UMBILICAL, OPEN       Anesthesia Type:  General    Note:  Disposition: Inpatient   Postop Pain Control: Uneventful            Sign Out: Well controlled pain   PONV: No   Neuro/Psych: Uneventful            Sign Out: Acceptable/Baseline neuro status   Airway/Respiratory: Uneventful            Sign Out: Acceptable/Baseline resp. status   CV/Hemodynamics: Uneventful            Sign Out: Acceptable CV status; No obvious hypovolemia; No obvious fluid overload   Other NRE: NONE   DID A NON-ROUTINE EVENT OCCUR? No           Last vitals:  Vitals Value Taken Time   /84 01/08/24 1615   Temp 37  C (98.6  F) 01/08/24 1300   Pulse 97 01/08/24 1615   Resp 16 01/08/24 1615   SpO2 94 % 01/08/24 1615   Vitals shown include unfiled device data.    Electronically Signed By: Nick Sun MD  January 8, 2024  4:16 PM

## 2024-01-08 NOTE — H&P
[facility]   History and Physical       History of Present Illness: 53 year old male w/ hx of colon polyp.     Past Medical History:   Diagnosis Date    Acute cystitis without hematuria     Colonic mass     Fatty liver        No Known Allergies    No past surgical history on file.    No family history on file.    Social History     Socioeconomic History    Marital status:      Spouse name: Not on file    Number of children: Not on file    Years of education: Not on file    Highest education level: Not on file   Occupational History    Not on file   Tobacco Use    Smoking status: Never    Smokeless tobacco: Never   Vaping Use    Vaping Use: Never used   Substance and Sexual Activity    Alcohol use: Not on file    Drug use: Not on file    Sexual activity: Not on file   Other Topics Concern    Not on file   Social History Narrative    Not on file     Social Determinants of Health     Financial Resource Strain: Unknown (12/22/2023)    Financial Resource Strain     Within the past 12 months, have you or your family members you live with been unable to get utilities (heat, electricity) when it was really needed?: Patient declined   Recent Concern: Financial Resource Strain - High Risk (11/10/2023)    Financial Resource Strain     Within the past 12 months, have you or your family members you live with been unable to get utilities (heat, electricity) when it was really needed?: Yes   Food Insecurity: Low Risk  (12/22/2023)    Food Insecurity     Within the past 12 months, did you worry that your food would run out before you got money to buy more?: No     Within the past 12 months, did the food you bought just not last and you didn t have money to get more?: Patient declined   Transportation Needs: Unknown (12/22/2023)    Transportation Needs     Within the past 12 months, has lack of transportation kept you from medical appointments, getting your medicines, non-medical meetings or  appointments, work, or from getting things that you need?: Patient declined   Physical Activity: Not on file   Stress: Not on file   Social Connections: Not on file   Interpersonal Safety: Low Risk  (11/10/2023)    Interpersonal Safety     Do you feel physically and emotionally safe where you currently live?: Yes     Within the past 12 months, have you been hit, slapped, kicked or otherwise physically hurt by someone?: No     Within the past 12 months, have you been humiliated or emotionally abused in other ways by your partner or ex-partner?: No   Housing Stability: Unknown (12/22/2023)    Housing Stability     Do you have housing? : Patient declined     Are you worried about losing your housing?: Patient declined       Current Facility-Administered Medications   Medication    BUPivacaine liposome (EXPAREL) 1.3 % LA inj susp 20 mL    BUPivacaine liposome (EXPAREL) 1.3 % LA inj susp    ceFAZolin Sodium (ANCEF) injection 2 g    ceFAZolin Sodium (ANCEF) injection 2 g    dexmedeTOMIDine (PRECEDEX) 4 mcg/mL in sodium chloride 0.9 % 100 mL infusion    lactated ringers infusion    lidocaine (LMX4) cream    lidocaine 1 % 0.1-1 mL    magnesium sulfate 4 g in 50 mL sterile water intermittent infusion    metroNIDAZOLE (FLAGYL) infusion 500 mg    sodium chloride (PF) 0.9% PF flush 3 mL    sodium chloride (PF) 0.9% PF flush 3 mL       Review of Systems:   A full 10 point review of systems was taken and is negative aside from what is noted above in the HPI.    Consitutional / General: Denies wt changes, fevers, chills or sweats.  Skin: Denies rashes, bruising, pruritis, or bleeding tendencies.   ENT: Denies trauma, headache, hearing loss, tinnitus, dysphagia  Eyes: Denies double vision  Respiratory: Denies SOB, cough, sputum production or dyspnea on exertion.   Cardiovascular: Denies chest pain, palpitations, orthostatic hypotension  Hematology / Lymph: Denies hx of blood clots or pulmonary embolism  Gastrointestinal:  Denies  loss of appetite, dysphagia, N/V, constipation or diarrhea.   Genitourinary: Denies dysuria, frequency, urgency, hesitancy, incontinence, nocturia, hematuria, difficulty starting or stopping their stream, hx of stones or hx of uti's.   Neurologic: Denies headache, LOC, memory loss, vertigo, syncope or seizures.   Musculoskeletal: Denies back pain, numbness, tingling or hx of back surgery  Psychological: alert and oriented    Intake/Output past 24 hrs:  No intake or output data in the 24 hours ending 01/08/24 0715    Physical Exam:  Temp:  [97.9  F (36.6  C)] 97.9  F (36.6  C)  Pulse:  [66] 66  Resp:  [18] 18  BP: (151)/(88) 151/88  SpO2:  [98 %] 98 %  General: NAD, alert, cooperative  Head: normocephalic, without abnormality / atraumatic  Respiratory: non-labored breathing, CTA-B  Cardiac: RRR +S1/S2  Abdomen: soft, non-tender, non-distended.  No guarding or rebound   Perianal/Rectal: deferred   Skin: no rashes or lesions  Musculoskeletal: moves all four extremities equally; no calf edema or tenderness  Psychological: alert and oriented, answers questions appropriately  Neurological: cranial nerves grossly intact    CBC RESULTS:   Recent Labs   Lab Test 01/08/24  0621 11/10/23  0831   WBC  --  6.9   RBC  --  5.19   HGB 16.9 15.6   HCT  --  46.8   MCV  --  90   MCH  --  30.1   MCHC  --  33.3   RDW  --  13.0   PLT  --  229       Last Comprehensive Metabolic Panel:  Sodium   Date Value Ref Range Status   11/10/2023 140 135 - 145 mmol/L Final     Comment:     Reference intervals for this test were updated on 09/26/2023 to more accurately reflect our healthy population. There may be differences in the flagging of prior results with similar values performed with this method. Interpretation of those prior results can be made in the context of the updated reference intervals.      Potassium   Date Value Ref Range Status   11/10/2023 4.4 3.4 - 5.3 mmol/L Final     Chloride   Date Value Ref Range Status   11/10/2023 103 98 -  107 mmol/L Final     Carbon Dioxide (CO2)   Date Value Ref Range Status   11/10/2023 26 22 - 29 mmol/L Final     Anion Gap   Date Value Ref Range Status   11/10/2023 11 7 - 15 mmol/L Final     GLUCOSE BY METER POCT   Date Value Ref Range Status   01/08/2024 112 (H) 70 - 99 mg/dL Final     Urea Nitrogen   Date Value Ref Range Status   11/10/2023 15.0 6.0 - 20.0 mg/dL Final     Creatinine   Date Value Ref Range Status   11/10/2023 0.83 0.67 - 1.17 mg/dL Final     GFR Estimate   Date Value Ref Range Status   11/10/2023 >90 >60 mL/min/1.73m2 Final     Calcium   Date Value Ref Range Status   11/10/2023 9.5 8.6 - 10.0 mg/dL Final       Assessment: Yeison Grijalva is a 53 year old male presenting for endoscopically unresectable colon polyp.    Plan: Robotic right colectomy.     Annamaria Menezes MD, JESÚS  [facility]  Office: [phone]  1/8/2024 7:15 AM

## 2024-01-08 NOTE — PHARMACY-ADMISSION MEDICATION HISTORY
Pharmacist Admission Medication History    Admission medication history is complete. The information provided in this note is only as accurate as the sources available at the time of the update.    Information Source(s): Patient via in-person    Pertinent Information: Patient completed antibiotics for surgery prep. He takes no other medications.    Changes made to PTA medication list:  Added: None  Deleted: None  Changed: None      Allergies reviewed with patient and updates made in EHR: yes    Medication History Completed By: Nelson Kiser [facility] 1/8/2024 7:05 AM    No outpatient medications have been marked as taking for the 1/8/24 encounter (Hospital Encounter).

## 2024-01-08 NOTE — BRIEF OP NOTE
[facility]    Brief Operative Note    Pre-operative diagnosis: Colon polyp [K63.5]  Post-operative diagnosis Same as pre-operative diagnosis    Procedure: ROBOTIC COLECTOMY RIGHT, Right - Abdomen  HERNIORRHAPHY, UMBILICAL, OPEN, N/A - Abdomen    Surgeon: Surgeon(s) and Role:     * Annamaria Menezes MD - Primary     * Calvin Koehler MD - Fellow - Assisting  Anesthesia: General with Block   Estimated Blood Loss: Less than 50 ml    Drains: None  Specimens:   ID Type Source Tests Collected by Time Destination   1 : Right colon- open and return Resection Large Intestine, Colon SURGICAL PATHOLOGY EXAM Annamaria Menezes MD 1/8/2024 11:55 AM      Findings:   Large polyp in cecum near ileocecal valve.  Side-to-side isoperistaltic ileocolic anastomosis.  Complications: None.  Implants: * No implants in log *          IVF: see anesthesia record  UOP: see anesthesia record  Condition on discharge from OR: Satisfactory    Calvin Koehler MD   [facility]   [phone].        ADDENDUM:    PATIENT DATA  Indicate Y or N:  Home O2 No  Hemodialysis  No  Transplant patient  No  Cirrhosis  No  Steroids in last 30 days  No  Immunomodulators in last 30 days  No  Anticoagulation at time of surgery  No   List medication n/a  Prior abdominal surgery  Yes  Pelvic irradiation  No    Albumin within 30 days if known : unknown   Hgb within 30 days if known    Hemoglobin   Date Value Ref Range Status   01/08/2024 16.9 13.3 - 17.7 g/dL Final   ]  Cr within 30 days if known    Creatinine   Date Value Ref Range Status   11/10/2023 0.83 0.67 - 1.17 mg/dL Final   ]  Body mass index is 32.15 kg/m .      OR DATA  Emergent  No   <24 hours  No   <1 week  No  Bowel Prep Yes  Antibiotics  Yes  DVT prophylaxis    Heparin  Yes   SCD  Yes   None  No  Drain  No  ASA (1,2,3,4) 3  OR time (min) 248 mins  Stents  No  Transfuse >/= 2U  No  Anastomosis   Stapled  Yes   Handsewn  Yes  Leak Test    Positive  No   Negative   No   Not done  Yes

## 2024-01-09 LAB
ANION GAP SERPL CALCULATED.3IONS-SCNC: 10 MMOL/L (ref 7–15)
BUN SERPL-MCNC: 11 MG/DL (ref 6–20)
CALCIUM SERPL-MCNC: 8.8 MG/DL (ref 8.6–10)
CHLORIDE SERPL-SCNC: 105 MMOL/L (ref 98–107)
CREAT SERPL-MCNC: 0.97 MG/DL (ref 0.67–1.17)
DEPRECATED HCO3 PLAS-SCNC: 26 MMOL/L (ref 22–29)
EGFRCR SERPLBLD CKD-EPI 2021: >90 ML/MIN/1.73M2
ERYTHROCYTE [DISTWIDTH] IN BLOOD BY AUTOMATED COUNT: 13.1 % (ref 10–15)
GLUCOSE SERPL-MCNC: 111 MG/DL (ref 70–99)
GLUCOSE SERPL-MCNC: 111 MG/DL (ref 70–99)
HCT VFR BLD AUTO: 42.9 % (ref 40–53)
HCV RNA SERPL NAA+PROBE-ACNC: NOT DETECTED IU/ML
HGB BLD-MCNC: 14.2 G/DL (ref 13.3–17.7)
MAGNESIUM SERPL-MCNC: 2.5 MG/DL (ref 1.7–2.3)
MCH RBC QN AUTO: 29.8 PG (ref 26.5–33)
MCHC RBC AUTO-ENTMCNC: 33.1 G/DL (ref 31.5–36.5)
MCV RBC AUTO: 90 FL (ref 78–100)
PLATELET # BLD AUTO: 282 10E3/UL (ref 150–450)
POTASSIUM SERPL-SCNC: 4.3 MMOL/L (ref 3.4–5.3)
POTASSIUM SERPL-SCNC: 4.3 MMOL/L (ref 3.4–5.3)
RBC # BLD AUTO: 4.77 10E6/UL (ref 4.4–5.9)
SODIUM SERPL-SCNC: 141 MMOL/L (ref 135–145)
WBC # BLD AUTO: 12.4 10E3/UL (ref 4–11)

## 2024-01-09 PROCEDURE — 85027 COMPLETE CBC AUTOMATED: CPT

## 2024-01-09 PROCEDURE — 80048 BASIC METABOLIC PNL TOTAL CA: CPT | Performed by: COLON & RECTAL SURGERY

## 2024-01-09 PROCEDURE — 258N000003 HC RX IP 258 OP 636

## 2024-01-09 PROCEDURE — 120N000001 HC R&B MED SURG/OB

## 2024-01-09 PROCEDURE — 250N000013 HC RX MED GY IP 250 OP 250 PS 637

## 2024-01-09 PROCEDURE — 250N000011 HC RX IP 250 OP 636

## 2024-01-09 PROCEDURE — 80048 BASIC METABOLIC PNL TOTAL CA: CPT

## 2024-01-09 PROCEDURE — 82947 ASSAY GLUCOSE BLOOD QUANT: CPT | Performed by: COLON & RECTAL SURGERY

## 2024-01-09 PROCEDURE — 36415 COLL VENOUS BLD VENIPUNCTURE: CPT

## 2024-01-09 RX ORDER — HYDRALAZINE HYDROCHLORIDE 20 MG/ML
10 INJECTION INTRAMUSCULAR; INTRAVENOUS EVERY 6 HOURS PRN
Status: DISCONTINUED | OUTPATIENT
Start: 2024-01-09 | End: 2024-01-10 | Stop reason: HOSPADM

## 2024-01-09 RX ADMIN — HYDRALAZINE HYDROCHLORIDE 10 MG: 20 INJECTION INTRAMUSCULAR; INTRAVENOUS at 21:48

## 2024-01-09 RX ADMIN — GABAPENTIN 100 MG: 100 CAPSULE ORAL at 10:19

## 2024-01-09 RX ADMIN — HYDROMORPHONE HYDROCHLORIDE 0.2 MG: 0.2 INJECTION, SOLUTION INTRAMUSCULAR; INTRAVENOUS; SUBCUTANEOUS at 16:23

## 2024-01-09 RX ADMIN — HYDRALAZINE HYDROCHLORIDE 10 MG: 20 INJECTION INTRAMUSCULAR; INTRAVENOUS at 14:58

## 2024-01-09 RX ADMIN — ACETAMINOPHEN 975 MG: 325 TABLET ORAL at 17:24

## 2024-01-09 RX ADMIN — ACETAMINOPHEN 975 MG: 325 TABLET ORAL at 01:29

## 2024-01-09 RX ADMIN — ACETAMINOPHEN 975 MG: 325 TABLET ORAL at 10:19

## 2024-01-09 RX ADMIN — HYDROMORPHONE HYDROCHLORIDE 0.2 MG: 0.2 INJECTION, SOLUTION INTRAMUSCULAR; INTRAVENOUS; SUBCUTANEOUS at 06:47

## 2024-01-09 RX ADMIN — GABAPENTIN 100 MG: 100 CAPSULE ORAL at 14:25

## 2024-01-09 RX ADMIN — GABAPENTIN 100 MG: 100 CAPSULE ORAL at 20:50

## 2024-01-09 RX ADMIN — SODIUM CHLORIDE, POTASSIUM CHLORIDE, SODIUM LACTATE AND CALCIUM CHLORIDE: 600; 310; 30; 20 INJECTION, SOLUTION INTRAVENOUS at 03:24

## 2024-01-09 ASSESSMENT — ACTIVITIES OF DAILY LIVING (ADL)
ADLS_ACUITY_SCORE: 26
ADLS_ACUITY_SCORE: 22
ADLS_ACUITY_SCORE: 26
ADLS_ACUITY_SCORE: 22
ADLS_ACUITY_SCORE: 26
ADLS_ACUITY_SCORE: 22

## 2024-01-09 NOTE — PROGRESS NOTES
"[facility]  Daily Progress Note    Subjective  Patient reports he is doing well this morning. Rates his pain 4-5/10. He is tolerating clear liquid diet. Denies nausea/vomiting. Feels there is \"air\" in his belly. Denies flatus and BM. Sat up at the edge of bed, has not been ambulating. Hypertensive overnight other VSS.     PO 1080ml   Pinto 2650ml     Objective  Intake/Output last 24 hrs:    Intake/Output Summary (Last 24 hours) at 1/9/2024 0935  Last data filed at 1/9/2024 0726  Gross per 24 hour   Intake 4595.5 ml   Output 2670 ml   Net 1925.5 ml     Temp:  [98.1  F (36.7  C)-98.6  F (37  C)] 98.1  F (36.7  C)  Pulse:  [] 71  Resp:  [14-34] 16  BP: (120-173)/(69-90) 157/85  SpO2:  [92 %-97 %] 96 %    Physical Exam:  General: awake, alert, lying in bed, in no acute distress  Head: normocephalic, atraumatic  Respiratory: non-labored breathing  Abdomen: soft, appropriately tender, non-distended              Incisions: clean, dry and intact. Tegaderm and dressing over umbilical hernia repair  Skin: No rashes or lesions  Musculoskeletal: moves all four extremities equally  Psychological: alert and oriented, answers questions appropriately  : Pinto draining clear yellow urine    Pertinent Labs  Lab Results: personally reviewed.  Lab Results   Component Value Date     01/09/2024     01/08/2024     11/10/2023    CO2 26 01/09/2024    CO2 20 01/08/2024    CO2 26 11/10/2023    BUN 11.0 01/09/2024    BUN 15.0 01/08/2024    BUN 15.0 11/10/2023     Lab Results   Component Value Date    WBC 12.4 01/09/2024    WBC 6.9 11/10/2023    HGB 14.2 01/09/2024    HGB 16.9 01/08/2024    HGB 15.6 11/10/2023    HCT 42.9 01/09/2024    HCT 46.8 11/10/2023    MCV 90 01/09/2024    MCV 90 11/10/2023     01/09/2024     11/10/2023       Assessment/Plan: This is a 53 year old male POD #1 s/p Robotic right colectomy and umbilical hernia repair for endoscopically unresectable right colon " polyp.    Cr 0.97  Hgb 14.2 (16.9)   WBC 12.4    Continue pain management  Advance to full liquid diet  Cap IV fluids with adequate PO intake  Pinto to be removed today  Lovenox held, will reassess with AM labs  Hydralazine PRN for HTN  OOB/Ambulate x4   AROBF    Discussed with ELAINE HoffmanC  [facility]  [phone]..............................main    Colorectal Surgery Staff:  I have seen and examined the patient. I agree with the above documentation and plan of the fellow/PA above with the following additions/chages:    Feeling good.  Passing gas and stool.    Afebrile without tachycardia.  Hypertensive to the 180s.  Abdomen is softly distended.  Tympanic to percussion.    Labs reviewed.    A/P: 53M POD 1 s/p robotic right colectomy.  -Advised to go slowly with diet until he is passing more gas and stool as he is distended.  -Repeat labs tomorrow  -Ambulate  -Hydralazine for hypertension    Annamaria Menezes MD MBA  [facility]  Office: [phone]  1/9/2024 7:20 PM

## 2024-01-09 NOTE — PLAN OF CARE
Goal Outcome Evaluation:       Patient's wife is visiting around 4:30 and he is wondering if he could go home with her. Page sent to surgery team who want to keep him another night.  Message passed to patient. He ambulated in the hallways x2 so far today.  BP upon return to room was 184/90.  Rechecked BP after he rested for a bit.  /89 on recheck.  PRN Hydralazine given.  IVF stopped this afternoon.  Good oral intake.  He is tolerating advanced diet from clear liquid to full.  Patient reports having a BM today.  No N/V.  Flu vaccine addressed.  Patient declines. Mild abdominal discomfort.  He declines abdominal binder and states additional intervention is not needed.  Pinto removed earlier today and patent voiding without difficulty.

## 2024-01-09 NOTE — PLAN OF CARE
Problem: Surgery Nonspecified  Goal: Optimal Pain Control and Function  Outcome: Progressing  Goal: Effective Urinary Elimination  Outcome: Progressing  Goal: Effective Oxygenation and Ventilation  Outcome: Progressing     Problem: Surgery Nonspecified  Goal: Effective Urinary Elimination  Outcome: Progressing     Problem: Surgery Nonspecified  Goal: Effective Oxygenation and Ventilation  Outcome: Progressing   Goal Outcome Evaluation:  Patient sating 94 on room air.   Patient rated pain 7/10 IV dilaudid given good relief rated 5/10.  Dangle on bed and stood up for few minutes .SCD applied.  Using IS independently.

## 2024-01-09 NOTE — PLAN OF CARE
Problem: Adult Inpatient Plan of Care  Goal: Optimal Comfort and Wellbeing  Outcome: Progressing     Problem: Surgery Nonspecified  Goal: Optimal Pain Control and Function  Outcome: Progressing  Goal: Nausea and Vomiting Relief  Outcome: Progressing  Goal: Effective Urinary Elimination  Outcome: Progressing   Goal Outcome Evaluation:    A/Ox4.    VSS.    Reported 5/10 abdominal pain was tolerable and declined PRN pain medication. Requested PRN at 0630, gave PRN 0.2 mg IV dilaudid.    Incisions C/D/I    RR dropping occasionally overnight per capnography. Pt states capnography alarmed when he would start to fall asleep. Refused CPAP.     Not OOB overnight.     Reported interrupted sleep due to capnography.           Calvin Orourke RN

## 2024-01-09 NOTE — PLAN OF CARE
1707 - 1900    Capnography initiated upon arrival to unit. RA.     R PIV infusing mIVf.     Pain 6/10 - PRN IV 0.4 mg diluadid given with scheduled tylenol - patient reports improvement in abdominal pain.     5 lap sites [place], approximated, and C/D/I. Tegaderm and dry gauze cover umbilicus incision - C/D/I.     Clear liquid diet - patient tolerated 480 mL PO intake.     Abdominal binder in room for comfort - plan to apply when out of bed tonight.     Post-op supplies in room. Patient stated he has used IS with past surgeries. Call light in reach and patient able to make needs known.     Problem: Adult Inpatient Plan of Care  Goal: Plan of Care Review  Description: The Plan of Care Review/Shift note should be completed every shift.  The Outcome Evaluation is a brief statement about your assessment that the patient is improving, declining, or no change.  This information will be displayed automatically on your shift  note.  Outcome: Progressing  Flowsheets (Taken 1/8/2024 1958)  Plan of Care Reviewed With: patient  Goal: Optimal Comfort and Wellbeing  Outcome: Progressing     Problem: Surgery Nonspecified  Goal: Anesthesia/Sedation Recovery  Outcome: Progressing  Goal: Effective Oxygenation and Ventilation  Outcome: Progressing  Intervention: Optimize Oxygenation and Ventilation  Recent Flowsheet Documentation  Taken 1/8/2024 1800 by Jojo Davis, RN  Head of Bed (HOB) Positioning: HOB at 30 degrees

## 2024-01-10 ENCOUNTER — TRANSFERRED RECORDS (OUTPATIENT)
Dept: LAB | Facility: HOSPITAL | Age: 54
End: 2024-01-10
Payer: COMMERCIAL

## 2024-01-10 VITALS
WEIGHT: 240.5 LBS | OXYGEN SATURATION: 93 % | TEMPERATURE: 98 F | RESPIRATION RATE: 18 BRPM | DIASTOLIC BLOOD PRESSURE: 99 MMHG | SYSTOLIC BLOOD PRESSURE: 180 MMHG | BODY MASS INDEX: 32.15 KG/M2 | HEART RATE: 83 BPM

## 2024-01-10 LAB
ANION GAP SERPL CALCULATED.3IONS-SCNC: 6 MMOL/L (ref 7–15)
BUN SERPL-MCNC: 9.6 MG/DL (ref 6–20)
CALCIUM SERPL-MCNC: 8.9 MG/DL (ref 8.6–10)
CHLORIDE SERPL-SCNC: 104 MMOL/L (ref 98–107)
CREAT SERPL-MCNC: 0.83 MG/DL (ref 0.67–1.17)
DEPRECATED HCO3 PLAS-SCNC: 27 MMOL/L (ref 22–29)
EGFRCR SERPLBLD CKD-EPI 2021: >90 ML/MIN/1.73M2
ERYTHROCYTE [DISTWIDTH] IN BLOOD BY AUTOMATED COUNT: 13 % (ref 10–15)
GLUCOSE SERPL-MCNC: 103 MG/DL (ref 70–99)
HCT VFR BLD AUTO: 43.5 % (ref 40–53)
HGB BLD-MCNC: 14.1 G/DL (ref 13.3–17.7)
MAGNESIUM SERPL-MCNC: 2.1 MG/DL (ref 1.7–2.3)
MCH RBC QN AUTO: 29.5 PG (ref 26.5–33)
MCHC RBC AUTO-ENTMCNC: 32.4 G/DL (ref 31.5–36.5)
MCV RBC AUTO: 91 FL (ref 78–100)
PLATELET # BLD AUTO: 280 10E3/UL (ref 150–450)
POTASSIUM SERPL-SCNC: 4.3 MMOL/L (ref 3.4–5.3)
RBC # BLD AUTO: 4.78 10E6/UL (ref 4.4–5.9)
SODIUM SERPL-SCNC: 137 MMOL/L (ref 135–145)
WBC # BLD AUTO: 9.9 10E3/UL (ref 4–11)

## 2024-01-10 PROCEDURE — 36415 COLL VENOUS BLD VENIPUNCTURE: CPT

## 2024-01-10 PROCEDURE — 8E0W4CZ ROBOTIC ASSISTED PROCEDURE OF TRUNK REGION, PERCUTANEOUS ENDOSCOPIC APPROACH: ICD-10-PCS | Performed by: COLON & RECTAL SURGERY

## 2024-01-10 PROCEDURE — 85027 COMPLETE CBC AUTOMATED: CPT

## 2024-01-10 PROCEDURE — 83735 ASSAY OF MAGNESIUM: CPT | Performed by: COLON & RECTAL SURGERY

## 2024-01-10 PROCEDURE — 250N000011 HC RX IP 250 OP 636

## 2024-01-10 PROCEDURE — 80048 BASIC METABOLIC PNL TOTAL CA: CPT

## 2024-01-10 PROCEDURE — 0DTF4ZZ RESECTION OF RIGHT LARGE INTESTINE, PERCUTANEOUS ENDOSCOPIC APPROACH: ICD-10-PCS | Performed by: COLON & RECTAL SURGERY

## 2024-01-10 PROCEDURE — 250N000013 HC RX MED GY IP 250 OP 250 PS 637

## 2024-01-10 PROCEDURE — 0WQF0ZZ REPAIR ABDOMINAL WALL, OPEN APPROACH: ICD-10-PCS | Performed by: COLON & RECTAL SURGERY

## 2024-01-10 RX ORDER — OXYCODONE HYDROCHLORIDE 5 MG/1
5-10 TABLET ORAL EVERY 4 HOURS PRN
Status: DISCONTINUED | OUTPATIENT
Start: 2024-01-10 | End: 2024-01-10 | Stop reason: HOSPADM

## 2024-01-10 RX ADMIN — GABAPENTIN 100 MG: 100 CAPSULE ORAL at 14:14

## 2024-01-10 RX ADMIN — ENOXAPARIN SODIUM 40 MG: 40 INJECTION SUBCUTANEOUS at 10:31

## 2024-01-10 RX ADMIN — GABAPENTIN 100 MG: 100 CAPSULE ORAL at 08:33

## 2024-01-10 RX ADMIN — ACETAMINOPHEN 975 MG: 325 TABLET ORAL at 01:59

## 2024-01-10 RX ADMIN — HYDRALAZINE HYDROCHLORIDE 10 MG: 20 INJECTION INTRAMUSCULAR; INTRAVENOUS at 08:33

## 2024-01-10 RX ADMIN — ACETAMINOPHEN 975 MG: 325 TABLET ORAL at 08:32

## 2024-01-10 ASSESSMENT — ACTIVITIES OF DAILY LIVING (ADL)
ADLS_ACUITY_SCORE: 22
ADLS_ACUITY_SCORE: 20

## 2024-01-10 NOTE — PLAN OF CARE
Problem: Adult Inpatient Plan of Care  Goal: Optimal Comfort and Wellbeing  Outcome: Progressing     Problem: Surgery Nonspecified  Goal: Optimal Pain Control and Function  Outcome: Progressing  Goal: Effective Urinary Elimination  Outcome: Progressing   Goal Outcome Evaluation:    Hypertensive. Received PRN hydralazine on evening shift, BP slowly trended down with 150/74 at last check. Other VSS.    A/Ox4.     Reported tolerable 2-3/10 abdominal pain.    Independent in room. Voiding freely. Passing flatus.    Slept between cares.          Calvin Orourke RN

## 2024-01-10 NOTE — DISCHARGE SUMMARY
Discharge Summary    Patient name: Yeison Grijalva  YOB: 1970   Age: 53 year old  Medical Record Number: [id number]  Primary Care Physician:  Peña Hill       Admission Date: 1/8/2024.  Discharge Date: 1/10/2024  Condition at Discharge: stable       Principal Diagnosis:  Colon polyp    HISTORY OF PRESENT ILLNESS: The patient is a 53 year old male with a history of an endoscopically unresectable polyp of the cecum.  We discussed the the procedure of robotic assisted sigmoid colectomy (possible open) and umbilical hernia repair (open/primary). Risks outlined include bleeding, infection (anastomotic leak <5%), and damage to surrounding structures including the small bowel and ureter. Please see my office note for the details of our discussion.      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  Diet was advanced with return of bowel function.  Pain medication was transitioned from IV to oral uneventfully. Patient had hypertension post-operatively which was managed with PRN Hydralazine. He is on no BP medications at baseline, however will have him follow-up closely with PCP for evaluation after discharge.  At the time of discharge, patient was voiding freely, tolerating a regular diet, and pain was controlled with oral pain medications.  Patient was discharged home on POD #2 in stable condition.      PHYSICAL EXAM ON DATE OF DISCHARGE:   General: awake, alert, lying in bed, in no acute distress  Head: normocephalic, atraumatic  Respiratory: non-labored breathing  Abdomen: soft, appropriately tender, non-distended              Incisions: clean, dry and intact.   Skin: No rashes or lesions  Musculoskeletal: moves all four extremities equally  Psychological: alert and oriented, answers questions appropriately    Vital Signs in last 24 hours:   Temp:  [98  F (36.7  C)-98.6  F (37  C)] 98  F (36.7  C)  Pulse:  [65-83] 83  Resp:  [17-18] 18  BP: (150-184)/() 180/99  SpO2:  [90 %-94 %] 93 %    COMPLICATIONS IN HOSPITAL: None    IMPORTANT PENDING TEST RESULTS: Pathology    Discharge Medications/Orders:     Review of your medicines      You have not been prescribed any medications.           FOLLOW UP: He should see Peña Hill in 7 days.  Follow up with Dr. Menezes in 3-4 weeks.     Total time spent for discharge on date of discharge: 20 minutes, with over half spent in counseling and coordinating care    I saw the patient on the date of discharge.    Viv Briggs PA-C  [facility]  [phone]    ADDENDUM:  Length of stay: 2 days  Indicate Y or N for the following:  UTI No  C diff No  PNA No  SSI No  DVT No  PE No  CVA No  MI No  Enterocutaneous fistula No  Peripheral nerve injury NO  Abscess (not adjacent to anastomosis) NO  Leak NO                        Treated with:              Antibiotics NO              Drain No              Reoperation No  Death within 30 days No  Reintubation NO  Reoperation NO              Procedure

## 2024-01-10 NOTE — PLAN OF CARE
Goal Outcome Evaluation:             Patient discharging to home in stable condition.  No new medications.  Follow appointment made with Primary care provider.  Home instruction per AVS.

## 2024-01-10 NOTE — PROGRESS NOTES
Col[facility] and Rectal Surgery  Daily Progress Note    Subjective  Patient reports minimal incisional abdominal pain. Tolerating full liquids without n/v - wants toast for breakfast. Passing gas and loose stools overnight. Urinating without issue after jarvis removal. Ambulated 5x yesterday. Wants to go home today. Hypertensive requiring PRN Hydralazine, otherwise vitally stable.      Objective  Intake/Output last 24 hrs:    Intake/Output Summary (Last 24 hours) at 1/9/2024 0935  Last data filed at 1/9/2024 0726  Gross per 24 hour   Intake 4595.5 ml   Output 2670 ml   Net 1925.5 ml     Temp:  [98  F (36.7  C)-98.6  F (37  C)] 98  F (36.7  C)  Pulse:  [65-83] 83  Resp:  [17-18] 18  BP: (150-184)/() 180/99  SpO2:  [90 %-94 %] 93 %    Physical Exam:  General: awake, alert, lying in bed, in no acute distress  Head: normocephalic, atraumatic  Respiratory: non-labored breathing  Abdomen: soft, appropriately tender, non-distended              Incisions: clean, dry and intact.   Skin: No rashes or lesions  Musculoskeletal: moves all four extremities equally  Psychological: alert and oriented, answers questions appropriately    Pertinent Labs  Lab Results: personally reviewed.  Lab Results   Component Value Date     01/09/2024     01/08/2024     11/10/2023    CO2 26 01/09/2024    CO2 20 01/08/2024    CO2 26 11/10/2023    BUN 11.0 01/09/2024    BUN 15.0 01/08/2024    BUN 15.0 11/10/2023     Lab Results   Component Value Date    WBC 12.4 01/09/2024    WBC 6.9 11/10/2023    HGB 14.2 01/09/2024    HGB 16.9 01/08/2024    HGB 15.6 11/10/2023    HCT 42.9 01/09/2024    HCT 46.8 11/10/2023    MCV 90 01/09/2024    MCV 90 11/10/2023     01/09/2024     11/10/2023       Assessment/Plan: This is a 53 year old male POD #2 s/p Robotic right colectomy and umbilical hernia repair for endoscopically unresectable right colon polyp. Path pending.     Cr: 0.83 (0.97)  WBC: 9.9 (12.4)  Hgb: 14.1 (14.2)    - Advance  to LFD  - Multimodal pain control  - Lovenox dvt ppx today, will not need extended course (will follow pathology)  - OOB/Ambulate  - Will need PCP follow-up for HTN  - OK to discharge today if tolerating LFD    Discussed with Dr. Clif Briggs, PACheleC  [facility]  [phone]..............................main

## 2024-01-10 NOTE — PLAN OF CARE
"  Problem: Adult Inpatient Plan of Care  Goal: Plan of Care Review  Description: The Plan of Care Review/Shift note should be completed every shift.  The Outcome Evaluation is a brief statement about your assessment that the patient is improving, declining, or no change.  This information will be displayed automatically on your shift  note.  1/9/2024 2252 by Tabitha Kiser, RN  Outcome: Progressing  1/9/2024 2252 by Tabitha Kiser RN  Outcome: Progressing  1/9/2024 1523 by Tabitha Kiser RN  Outcome: Progressing     Problem: Surgery Nonspecified  Goal: Effective Bowel Elimination  Outcome: Progressing   Goal Outcome Evaluation:       PRN hydralazine given x2 today with some improvement but still elevated.  Surgery team notified.  No change in BP after PRN dilaudid given for 4/10 pain.  Patient stated Dilaudid helped him \"relax\" a bit more.  No PO narcotics till diet advanced per surgery.  Patient increased activity throughout the day and is hopeful discharge tomorrow.               "

## 2024-01-12 LAB
PATH REPORT.COMMENTS IMP SPEC: NORMAL
PATH REPORT.COMMENTS IMP SPEC: NORMAL
PATH REPORT.FINAL DX SPEC: NORMAL
PATH REPORT.GROSS SPEC: NORMAL
PATH REPORT.MICROSCOPIC SPEC OTHER STN: NORMAL
PATH REPORT.RELEVANT HX SPEC: NORMAL
PHOTO IMAGE: NORMAL

## 2024-01-14 ENCOUNTER — HOSPITAL ENCOUNTER (EMERGENCY)
Facility: CLINIC | Age: 54
Discharge: HOME | End: 2024-01-15
Attending: EMERGENCY MEDICINE | Admitting: EMERGENCY MEDICINE
Payer: COMMERCIAL

## 2024-01-14 ENCOUNTER — NURSE TRIAGE (OUTPATIENT)
Dept: NURSING | Facility: CLINIC | Age: 54
End: 2024-01-14
Payer: COMMERCIAL

## 2024-01-14 ENCOUNTER — APPOINTMENT (OUTPATIENT)
Dept: CT IMAGING | Facility: CLINIC | Age: 54
End: 2024-01-14
Attending: EMERGENCY MEDICINE
Payer: COMMERCIAL

## 2024-01-14 DIAGNOSIS — T14.8XXA HEMATOMA OF SKIN: ICD-10-CM

## 2024-01-14 DIAGNOSIS — R19.00 ABDOMINAL WALL BULGE: ICD-10-CM

## 2024-01-14 LAB
ANION GAP SERPL CALCULATED.3IONS-SCNC: 10 MMOL/L (ref 7–15)
BASOPHILS # BLD AUTO: 0.1 10E3/UL (ref 0–0.2)
BASOPHILS NFR BLD AUTO: 1 %
BUN SERPL-MCNC: 16.8 MG/DL (ref 6–20)
CALCIUM SERPL-MCNC: 9.1 MG/DL (ref 8.6–10)
CHLORIDE SERPL-SCNC: 103 MMOL/L (ref 98–107)
CREAT SERPL-MCNC: 0.84 MG/DL (ref 0.67–1.17)
DEPRECATED HCO3 PLAS-SCNC: 24 MMOL/L (ref 22–29)
EGFRCR SERPLBLD CKD-EPI 2021: >90 ML/MIN/1.73M2
EOSINOPHIL # BLD AUTO: 0.4 10E3/UL (ref 0–0.7)
EOSINOPHIL NFR BLD AUTO: 4 %
ERYTHROCYTE [DISTWIDTH] IN BLOOD BY AUTOMATED COUNT: 12.5 % (ref 10–15)
GLUCOSE SERPL-MCNC: 109 MG/DL (ref 70–99)
HCT VFR BLD AUTO: 40.1 % (ref 40–53)
HGB BLD-MCNC: 13.4 G/DL (ref 13.3–17.7)
IMM GRANULOCYTES # BLD: 0 10E3/UL
IMM GRANULOCYTES NFR BLD: 0 %
LACTATE SERPL-SCNC: 1 MMOL/L (ref 0.7–2)
LYMPHOCYTES # BLD AUTO: 2 10E3/UL (ref 0.8–5.3)
LYMPHOCYTES NFR BLD AUTO: 19 %
MCH RBC QN AUTO: 29.9 PG (ref 26.5–33)
MCHC RBC AUTO-ENTMCNC: 33.4 G/DL (ref 31.5–36.5)
MCV RBC AUTO: 90 FL (ref 78–100)
MONOCYTES # BLD AUTO: 0.9 10E3/UL (ref 0–1.3)
MONOCYTES NFR BLD AUTO: 9 %
NEUTROPHILS # BLD AUTO: 7 10E3/UL (ref 1.6–8.3)
NEUTROPHILS NFR BLD AUTO: 67 %
NRBC # BLD AUTO: 0 10E3/UL
NRBC BLD AUTO-RTO: 0 /100
PLATELET # BLD AUTO: 327 10E3/UL (ref 150–450)
POTASSIUM SERPL-SCNC: 4 MMOL/L (ref 3.4–5.3)
RBC # BLD AUTO: 4.48 10E6/UL (ref 4.4–5.9)
SODIUM SERPL-SCNC: 137 MMOL/L (ref 135–145)
WBC # BLD AUTO: 10.4 10E3/UL (ref 4–11)

## 2024-01-14 PROCEDURE — 80048 BASIC METABOLIC PNL TOTAL CA: CPT | Performed by: EMERGENCY MEDICINE

## 2024-01-14 PROCEDURE — 36415 COLL VENOUS BLD VENIPUNCTURE: CPT | Performed by: EMERGENCY MEDICINE

## 2024-01-14 PROCEDURE — 83605 ASSAY OF LACTIC ACID: CPT | Performed by: EMERGENCY MEDICINE

## 2024-01-14 PROCEDURE — 99285 EMERGENCY DEPT VISIT HI MDM: CPT | Performed by: EMERGENCY MEDICINE

## 2024-01-14 PROCEDURE — 74177 CT ABD & PELVIS W/CONTRAST: CPT

## 2024-01-14 PROCEDURE — 250N000011 HC RX IP 250 OP 636: Performed by: EMERGENCY MEDICINE

## 2024-01-14 PROCEDURE — 96374 THER/PROPH/DIAG INJ IV PUSH: CPT | Mod: 59

## 2024-01-14 PROCEDURE — 99285 EMERGENCY DEPT VISIT HI MDM: CPT | Mod: 25

## 2024-01-14 PROCEDURE — 250N000013 HC RX MED GY IP 250 OP 250 PS 637: Performed by: EMERGENCY MEDICINE

## 2024-01-14 PROCEDURE — 250N000009 HC RX 250: Performed by: EMERGENCY MEDICINE

## 2024-01-14 PROCEDURE — 85025 COMPLETE CBC W/AUTO DIFF WBC: CPT | Performed by: EMERGENCY MEDICINE

## 2024-01-14 RX ORDER — IOPAMIDOL 755 MG/ML
100 INJECTION, SOLUTION INTRAVASCULAR ONCE
Status: COMPLETED | OUTPATIENT
Start: 2024-01-14 | End: 2024-01-14

## 2024-01-14 RX ORDER — HYDROMORPHONE HYDROCHLORIDE 1 MG/ML
0.5 INJECTION, SOLUTION INTRAMUSCULAR; INTRAVENOUS; SUBCUTANEOUS EVERY 30 MIN PRN
Status: DISCONTINUED | OUTPATIENT
Start: 2024-01-14 | End: 2024-01-15 | Stop reason: HOSPADM

## 2024-01-14 RX ORDER — ACETAMINOPHEN 500 MG
1000 TABLET ORAL ONCE
Status: COMPLETED | OUTPATIENT
Start: 2024-01-14 | End: 2024-01-14

## 2024-01-14 RX ADMIN — IOPAMIDOL 100 ML: 755 INJECTION, SOLUTION INTRAVENOUS at 23:23

## 2024-01-14 RX ADMIN — ACETAMINOPHEN 1000 MG: 500 TABLET, FILM COATED ORAL at 22:51

## 2024-01-14 RX ADMIN — HYDROMORPHONE HYDROCHLORIDE 0.5 MG: 1 INJECTION, SOLUTION INTRAMUSCULAR; INTRAVENOUS; SUBCUTANEOUS at 23:17

## 2024-01-14 RX ADMIN — SODIUM CHLORIDE 70 ML: 9 INJECTION, SOLUTION INTRAVENOUS at 23:23

## 2024-01-14 ASSESSMENT — ACTIVITIES OF DAILY LIVING (ADL): ADLS_ACUITY_SCORE: 33

## 2024-01-15 VITALS
HEIGHT: 73 IN | SYSTOLIC BLOOD PRESSURE: 142 MMHG | HEART RATE: 59 BPM | RESPIRATION RATE: 16 BRPM | OXYGEN SATURATION: 98 % | WEIGHT: 239.2 LBS | TEMPERATURE: 98.1 F | DIASTOLIC BLOOD PRESSURE: 87 MMHG | BODY MASS INDEX: 31.7 KG/M2

## 2024-01-15 NOTE — ED TRIAGE NOTES
"Pt presents with concern for surgical site dehiscence after sneeze this evening. Reports feeling \"a pain\" after sneeze. Area is hardened and swollen and noted bleeding at end of surgical site. Surgery was 1/8: partial cholectomy, hernia repair, and appendectomy. Dressing changed upon arrival to ER to monitor bleeding output.      Triage Assessment (Adult)       Row Name 01/14/24 4171          Triage Assessment    Airway WDL WDL        Respiratory WDL    Respiratory WDL WDL        Skin Circulation/Temperature WDL    Skin Circulation/Temperature WDL WDL        Cardiac WDL    Cardiac WDL WDL        Peripheral/Neurovascular WDL    Peripheral Neurovascular WDL WDL        Cognitive/Neuro/Behavioral WDL    Cognitive/Neuro/Behavioral WDL WDL                     "

## 2024-01-15 NOTE — TELEPHONE ENCOUNTER
Surgery Monday with Dr Card. Part of his colon was removed. He sneezed hard and now he thinks he has a hernia by his incision. He can feel a hard lump that is bleeding from the surgery site. He is enroute, wants to know if he should go to [facility] (nearest) or [facility] (15 min away) where Dr Card did his surgery? (Either option considered OK) He states he will go to the nearest ER ([place]).    Blanca Bustamante RN Triage Nurse Advisor 10:24 PM 1/14/2024  Reason for Disposition   [1] Bleeding from incision AND [2] won't stop after 10 minutes of direct pressure    Protocols used: Post-Op Incision Symptoms and Lwmeukeky-E-DH

## 2024-01-15 NOTE — ED PROVIDER NOTES
ED Provider Note  [facility]      History     Chief Complaint   Patient presents with    Wound Dehiscence     HPI  Yeison Grijalva is a 53 year old male who presents to the emergency department with abdominal pain, and abdominal fullness sensation in the area of an incision of the left mid abdomen.  Patient had right colectomy procedure that was performed 6 days ago.  I reviewed medical records, and patient had surgery performed at [facility].  Discharged home.  Patient states he has otherwise been doing well, however about 3 hours prior to ED arrival patient had sneeze, and subsequently felt a popping sensation of the left mid abdomen.  Felt a bulge, did have slight bleeding from the area of the wound.  There has been no vomiting.  Given the bleeding from the wound, nurse triage line was called, and patient now presents to the emergency department.        Independent Historian:      Review of External Notes:  As above      Allergies:  No Known Allergies    Problem List:    Patient Active Problem List    Diagnosis Date Noted    Colon polyp 01/08/2024     Priority: Medium    Fatty liver 12/11/2023     Priority: Medium        Past Medical History:    Past Medical History:   Diagnosis Date    Acute cystitis without hematuria     Colonic mass     Fatty liver        Past Surgical History:    Past Surgical History:   Procedure Laterality Date    COLECTOMY, RIGHT, ROBOT-ASSISTED, LAPAROSCOPIC, USING DA ANCA XI Right 1/8/2024    Procedure: ROBOTIC COLECTOMY RIGHT;  Surgeon: Annamaria Menezes MD;  Location: [facility] [place] OR    HERNIORRHAPHY UMBILICAL N/A 1/8/2024    Procedure: HERNIORRHAPHY, UMBILICAL, OPEN;  Surgeon: Annamaria Menezes MD;  Location: [facility] [place] OR       Family History:    No family history on file.    Social History:  Marital Status:   [2]  Social History     Tobacco Use    Smoking status: Never    Smokeless tobacco: Never   Vaping Use    Vaping Use: Never used  "  Substance Use Topics    Alcohol use: Never    Drug use: Never        Medications:    No current outpatient medications on file.        Review of Systems  A medically appropriate review of systems was performed with pertinent positives and negatives noted in the HPI, and all other systems negative.    Physical Exam   Patient Vitals for the past 24 hrs:   BP Temp Temp src Pulse Resp SpO2 Height Weight   01/14/24 2236 (!) 190/89 98.1  F (36.7  C) Oral 64 16 98 % 1.854 m (6' 1\") 108.5 kg (239 lb 3.2 oz)          Physical Exam  General: alert and in  acute distress on arrival  Head: atraumatic, normocephalic  Lungs:  nonlabored  CV:  extremities warm and perfused  Abd: nondistended skin incision with slight amounts of left-sided bleeding, with fullness appearance near the incision, and firm masslike feeling measuring approximately 4 cm x 8 cm near the incision site.  Remainder of incisions appear clean, dry, and intact.  Remainder of abdomen soft, nontender.  Skin: no rashes, no diaphoresis and skin color normal  Neuro: Patient awake, alert, speech is fluent,   Psychiatric: affect/mood normal,        ED Course                 Procedures                           Results for orders placed or performed during the hospital encounter of 01/14/24 (from the past 24 hour(s))   CBC with platelets differential    Narrative    The following orders were created for panel order CBC with platelets differential.  Procedure                               Abnormality         Status                     ---------                               -----------         ------                     CBC with platelets and d...[[id number]]                      Final result                 Please view results for these tests on the individual orders.   Basic metabolic panel   Result Value Ref Range    Sodium 137 135 - 145 mmol/L    Potassium 4.0 3.4 - 5.3 mmol/L    Chloride 103 98 - 107 mmol/L    Carbon Dioxide (CO2) 24 22 - 29 mmol/L    Anion Gap 10 " 7 - 15 mmol/L    Urea Nitrogen 16.8 6.0 - 20.0 mg/dL    Creatinine 0.84 0.67 - 1.17 mg/dL    GFR Estimate >90 >60 mL/min/1.73m2    Calcium 9.1 8.6 - 10.0 mg/dL    Glucose 109 (H) 70 - 99 mg/dL   Lactic acid whole blood   Result Value Ref Range    Lactic Acid 1.0 0.7 - 2.0 mmol/L   CBC with platelets and differential   Result Value Ref Range    WBC Count 10.4 4.0 - 11.0 10e3/uL    RBC Count 4.48 4.40 - 5.90 10e6/uL    Hemoglobin 13.4 13.3 - 17.7 g/dL    Hematocrit 40.1 40.0 - 53.0 %    MCV 90 78 - 100 fL    MCH 29.9 26.5 - 33.0 pg    MCHC 33.4 31.5 - 36.5 g/dL    RDW 12.5 10.0 - 15.0 %    Platelet Count 327 150 - 450 10e3/uL    % Neutrophils 67 %    % Lymphocytes 19 %    % Monocytes 9 %    % Eosinophils 4 %    % Basophils 1 %    % Immature Granulocytes 0 %    NRBCs per 100 WBC 0 <1 /100    Absolute Neutrophils 7.0 1.6 - 8.3 10e3/uL    Absolute Lymphocytes 2.0 0.8 - 5.3 10e3/uL    Absolute Monocytes 0.9 0.0 - 1.3 10e3/uL    Absolute Eosinophils 0.4 0.0 - 0.7 10e3/uL    Absolute Basophils 0.1 0.0 - 0.2 10e3/uL    Absolute Immature Granulocytes 0.0 <=0.4 10e3/uL    Absolute NRBCs 0.0 10e3/uL   CT Abdomen Pelvis w Contrast    Narrative    EXAM: CT ABDOMEN PELVIS W CONTRAST  LOCATION: [facility]  DATE: 1/14/2024    INDICATION: Concern for incisional hernia in the left mid abdomen.  Patient with increased abdominal pain, masslike sensation near incision after recent right colectomy performed 6 days ago.  Evaluate for incarcerated bowel  COMPARISON: 12/20/2023  TECHNIQUE: CT scan of the abdomen and pelvis was performed following injection of IV contrast. Multiplanar reformats were obtained. Dose reduction techniques were used.  CONTRAST: 100 mL Isovue 370    FINDINGS:   LOWER CHEST: Normal.    HEPATOBILIARY: No significant mass or bile duct dilatation. Gallbladder is surgically absent.    PANCREAS: No significant mass, duct dilatation, or inflammatory change.    SPLEEN: Normal.    ADRENAL GLANDS:  Normal.    KIDNEYS/BLADDER: No significant mass, stones, or hydronephrosis. There are simple or benign cysts. No follow up is needed.    BOWEL: Postoperative changes of right hemicolectomy with ileocolonic anastomosis in the region of the mid transverse colon. Nonspecific nonobstructive bowel gas pattern. Scattered colonic diverticula without evidence for diverticulitis.    LYMPH NODES: No lymphadenopathy.    VASCULATURE: No abdominal aortic aneurysm.    PELVIC ORGANS: No pelvic masses. Small amount of free fluid in the pelvic cul-de-sac.    MUSCULOSKELETAL: Mild spondylosis. No concerning osseous abnormalities. Fluid collection containing bubbles of gas within the left abdominal wall musculature measuring approximately 7.2 x 7.4 x 4.2 cm. This could represent an abscess. There is thickening   of the left abdominal rectus muscle which may be reactive or secondary to hematoma.      Impression    IMPRESSION:   1.  Fluid collection containing bubbles of gas within the left abdominal wall musculature measuring approximately 7.2 x 7.4 x 4.2 cm. This could represent an abscess. There is thickening of the left abdominal rectus muscle which may be reactive or   secondary to hematoma.    2.  Postoperative changes of right hemicolectomy with ileocolonic anastomosis in the region of the mid transverse colon. Nonspecific nonobstructive bowel gas pattern. Scattered colonic diverticula without evidence for diverticulitis.       MEDICATIONS GIVEN IN THE EMERGENCY DEPARTMENT:  Medications   HYDROmorphone (PF) (DILAUDID) injection 0.5 mg (0.5 mg Intravenous $Given 1/14/24 2687)   acetaminophen (TYLENOL) tablet 1,000 mg (1,000 mg Oral $Given 1/14/24 2251)   iopamidol (ISOVUE-370) solution 100 mL (100 mLs Intravenous $Given 1/14/24 4713)   sodium chloride 0.9 % bag 500mL for CT scan flush use (70 mLs Intravenous $Given 1/14/24 2323)           Independent Interpretation (X-rays, CTs, rhythm strip):  No signs of bowel in the  extraperitoneal space.      Consultations/Discussion of Management or Tests:  None       Social Determinants of Health affecting care:         Assessments & Plan (with Medical Decision Making)  53 year old male who presents to the Emergency Department for evaluation of abdominal pain, especially localized near the area of incision.  Has masslike fullness sensation, and palpable masslike area near abdominal incision.  Concern is for incisional hernia with concern for bowel which is present.  Will evaluate for incarceration.  Will obtain blood test to evaluate for elevated lactate, or other findings that would be more concerning given the likely incisional hernia.  CT scan is also ordered.    CT scan images are personally reviewed.  Radiology impression also reviewed.  Radiology notes consideration of potential abscess.  However, given the location, and patient's onset of symptoms after sneeze, with some bleeding from the incision site which is now controlled, I feel that this likely represents hematoma.    Patient encouraged on applying pressure, ice to the area if recurrence of bleeding.  He is encouraged to monitor for any signs of infection and if they were to develop he is recommended to follow-up with his surgery team, and likely present to the emergency department, or other clinic.    Patient comfortable with the above plan.  Laboratory workup had been unremarkable.  Remainder of CT imaging negative.         I have reviewed the nursing notes.    I have reviewed the findings, diagnosis, plan and need for follow up with the patient.       Critical Care time:  none      NEW PRESCRIPTIONS STARTED AT TODAY'S ER VISIT  New Prescriptions    No medications on file       Final diagnoses:   Hematoma of skin   Abdominal wall bulge       1/14/2024   [facility] EMERGENCY DEPT       Manolo Harris MD  01/15/24 0002

## 2024-01-15 NOTE — ED NOTES
Wound is approximated, bleeding slight from lateral end of incision. Pt reports he sneezed approx. 3-4 hours ago. Pt reports he has had increased pressure surrounding surgical site and swelling. Swelling and firmness noted around surgical site. MD at bedside for evaluation.

## 2024-01-15 NOTE — DISCHARGE INSTRUCTIONS
This appears to be a hematoma (blood collection underneath the skin).  If you develop uncontrolled bleeding, or any signs of infection such as redness, pus, or fever, then recommend being seen in the emergency department, or calling your surgery clinic if that occurs.    You can apply ice to the area multiple times daily over the coming days.

## 2024-01-18 ENCOUNTER — OFFICE VISIT (OUTPATIENT)
Dept: FAMILY MEDICINE | Facility: CLINIC | Age: 54
End: 2024-01-18
Payer: COMMERCIAL

## 2024-01-18 VITALS
WEIGHT: 234.4 LBS | BODY MASS INDEX: 31.07 KG/M2 | DIASTOLIC BLOOD PRESSURE: 70 MMHG | HEIGHT: 73 IN | SYSTOLIC BLOOD PRESSURE: 130 MMHG | RESPIRATION RATE: 22 BRPM | OXYGEN SATURATION: 99 % | TEMPERATURE: 97.3 F | HEART RATE: 72 BPM

## 2024-01-18 DIAGNOSIS — S30.1XXD HEMATOMA OF ABDOMINAL WALL, SUBSEQUENT ENCOUNTER: Primary | ICD-10-CM

## 2024-01-18 DIAGNOSIS — R03.0 ELEVATED BLOOD PRESSURE READING WITHOUT DIAGNOSIS OF HYPERTENSION: ICD-10-CM

## 2024-01-18 PROCEDURE — 99213 OFFICE O/P EST LOW 20 MIN: CPT | Performed by: PHYSICIAN ASSISTANT

## 2024-01-18 ASSESSMENT — PAIN SCALES - GENERAL: PAINLEVEL: MILD PAIN (2)

## 2024-01-18 NOTE — PROGRESS NOTES
"  Assessment & Plan     Hematoma of abdominal wall, subsequent encounter  ER note reviewed.  Symptoms have been stable.  He denies any systemic symptoms.  Encouraged to use warm compresses, Tylenol/ibuprofen for pain.  He has been using Tylenol PM to help him sleep which is working quite well.  He will monitor for worsening symptoms and signs of infection and follow-up with the surgical team as previously scheduled.    Elevated blood pressure reading without diagnosis of hypertension  Elevated postop.  His blood pressures have been fine here in clinic which was again the case today.  He has been monitoring his blood pressure at home which has been borderline normal.  He will continue this for 1 more month and if elevated at home we will then start a medicine.  Otherwise he was continue to monitor his blood pressure at home. Reassurance provided.     MED REC REQUIRED  Post Medication Reconciliation Status:  Discharge medications reconciled, continue medications without change  BMI  Estimated body mass index is 30.93 kg/m  as calculated from the following:    Height as of this encounter: 1.854 m (6' 1\").    Weight as of this encounter: 106.3 kg (234 lb 6.4 oz).     Claudine Latham is a 53 year old, presenting for the following health issues:  Hospital F/U        1/18/2024     8:48 AM   Additional Questions   Roomed by Erica KEARNEY CMA     [facility]     Hospital Follow-up Visit:    Hospital/Nursing Home/IP Rehab Facility: [facility]  Date of Admission: 01/08/2024  Date of Discharge: 01/10/2024  Reason(s) for Admission: Colon polyp    Was your hospitalization related to COVID-19? No   Problems taking medications regularly:  None  Medication changes since discharge: None  Problems adhering to non-medication therapy:  None-has been taking his blood pressure regularly per their recommendations.  His BP was running higher in the hospital, so they told him to check it regularly and follow up with a " "PCP.    Patient was subsequently seen in the [facility] ER on 01/14/2024 because he sneezed too hard, and his wound started to bleed.  He found out it was a hematoma.     Summary of hospitalization:  [facility] discharge summary reviewed  Diagnostic Tests/Treatments reviewed.  Follow up needed: none  Other Healthcare Providers Involved in Patient s Care:         None  Update since discharge: improved.         Plan of care communicated with patient  Elevated bP post op. Has been normal at home.         Objective    /70   Pulse 72   Temp 97.3  F (36.3  C) (Tympanic)   Resp 22   Ht 1.854 m (6' 1\")   Wt 106.3 kg (234 lb 6.4 oz)   SpO2 99%   BMI 30.93 kg/m    Body mass index is 30.93 kg/m .    Physical Exam   Constitutional: healthy, alert, and no distress  Head: Normocephalic. Atraumatic  Eyes: No conjunctival injection, sclera anicteric  Neck: supple, no thyromegaly, nodules or asymmetry of the thyroid. No cervical LAD.  Cardiovascular: RRR. No murmurs, clicks, gallops, or rubs. No peripheral edema.   Respiratory: No resp distress. Lungs CTAB bilaterally.   Abdomen: Mostly soft.  There is an area of firmness surrounding the horizontal surgical incision site with surrounding ecchymosis.  This is mildly tender.  No excessive warmth or erythema.  Other laparoscopic surgical sites are well-appearing and appear well-healing.  Musculoskeletal: extremities normal- no gross deformities noted, and normal muscle tone  Skin: no suspicious lesions or rashes  Neurologic: Gait normal. CN 2-12 grossly intact  Psychiatric: mentation appears normal and affect normal/bright           Signed Electronically by: Peña Hill PA-C  "

## 2024-01-18 NOTE — PATIENT INSTRUCTIONS
Your abdomen looks good today.  Monitor ongoing for signs of infection.    Blood pressure has also been normal.  Keep up with taking it 1-2 times per day for the next month or so.  If it is all been normal then you can stop and take it periodically.  If elevated consistently above 140/90 let me know so that we can consider starting on a medicine.

## 2024-02-07 ENCOUNTER — TELEPHONE (OUTPATIENT)
Dept: FAMILY MEDICINE | Facility: CLINIC | Age: 54
End: 2024-02-07
Payer: COMMERCIAL

## 2024-02-07 NOTE — TELEPHONE ENCOUNTER
Received call from [employer] as follow up. He faxed a MD Collaboration form last week to the clinic for Peña Hill. He is asking for information on any preventative labs and screenings that have been done as well as the results to close any care gaps so that they can provide information and education to Yeison. He states he recently spoke with Yeison and knew about his recent surgery. I did not give him any information. I called him back and left the phone number on his identified voicemail to contact [facility] at [phone].  Savannah JOHNSON RN

## 2024-05-19 ENCOUNTER — HEALTH MAINTENANCE LETTER (OUTPATIENT)
Age: 54
End: 2024-05-19

## 2024-11-19 ENCOUNTER — TRANSFERRED RECORDS (OUTPATIENT)
Dept: HEALTH INFORMATION MANAGEMENT | Facility: CLINIC | Age: 54
End: 2024-11-19

## 2024-11-20 ENCOUNTER — TRANSFERRED RECORDS (OUTPATIENT)
Dept: HEALTH INFORMATION MANAGEMENT | Facility: CLINIC | Age: 54
End: 2024-11-20

## 2025-01-20 ENCOUNTER — NURSE TRIAGE (OUTPATIENT)
Dept: FAMILY MEDICINE | Facility: CLINIC | Age: 55
End: 2025-01-20
Payer: COMMERCIAL

## 2025-01-20 NOTE — TELEPHONE ENCOUNTER
[address] [facility]     Emergency Department     Faculty Attestation    I performed a history and physical examination of the patient and discussed management with the resident. I have reviewed and agree with the residents findings including all diagnostic interpretations, and treatment plans as written. Any areas of disagreement are noted on the chart. I was personally present for the key portions of any procedures. I have documented in the chart those procedures where I was not present during the key portions. I have reviewed the emergency nurses triage note. I agree with the chief complaint, past medical history, past surgical history, allergies, medications, social and family history as documented unless otherwise noted below. Documentation of the HPI, Physical Exam and Medical Decision Making performed by [email or web address] is based on my personal performance of the HPI, PE and MDM. For Physician Assistant/ Nurse Practitioner cases/documentation I have personally evaluated this patient and have completed at least one if not all key elements of the E/M (history, physical exam, and MDM). Additional findings are as noted. Adult male, mobile stroke called for mentation change wife heard \"thump\" pt found down, mobile stroke dx ich, on eliquis, given 2500 unit kcentra per mobile stroke, Dr Jones Ion request total 4000 unit k centra  cardene started per mobile stroke,   pe sbp 140s, pupil L 5 R 2, no cervical deformity or crepitus  Vomitus noted about face,   Chest symmetric,     gcs 3, oxygenated, airway suctioned, intubated first pass / visualized through vocal cords,     k centra, cardene, neuro surgery,     EKG Interpretation    Interpreted by me      CRITICAL CARE: There was a high probability of clinically significant/life threatening deterioration in this patient's condition which required my urgent intervention. Total critical care time was 15 minutes.   This excludes any Patient calling regarding elbow pain  States he has bilateral elbow pain however right is worse and was wrenching on something recently and may have injured it  Has numbness up into the bicep  Can move the arm normally  Denies swelling, bruising, cyanosis or pallor    Scheduled for appointment tomorrow    Savannah Guan RN on 1/20/2025 at 2:46 PM

## 2025-01-20 NOTE — TELEPHONE ENCOUNTER
"Reason for Disposition   [1] After 3 days AND [2] pain not improving    Answer Assessment - Initial Assessment Questions  1. MECHANISM: \"How did the injury happen?\"      2-3 weeks ago, right, soreness in left  2. ONSET: \"When did the injury happen?\" (Minutes or hours ago)       3 weeks  3. LOCATION: \"What part of the elbow is the injured?\"       Wrenching on a car  4. APPEARANCE of INJURY: \"What does the injury look like?\"       normal  5. SEVERITY: \"Can you use the elbow normally?\"  \"Can you bend it and straighten it fully?\"      Full ROM  6. SIZE: For cuts, bruises, or swelling, ask: \"How large is it?\" (e.g., inches or centimeters; entire joint)       denies  7. PAIN: \"Is there pain?\" If Yes, ask: \"How bad is the pain?\"    (Scale 1-10; or mild, moderate, severe)    - NONE (0): no pain.    - MILD (1-3): doesn't interfere with normal activities.    - MODERATE (4-7): interferes with normal activities (e.g., work or school) or awakens from sleep.    - SEVERE (8-10): excruciating pain, unable to do any normal activities, unable to use arm at all.      6 at its worst  8. TETANUS: For any breaks in the skin, ask: \"When was the last tetanus booster?\"      na  9. OTHER SYMPTOMS: \"Do you have any other symptoms?\"  (e.g., numbness in hand)      Numbness up to tricep of right arm  10. PREGNANCY: \"Is there any chance you are pregnant?\" \"When was your last menstrual period?\"        na    Protocols used: Elbow Injury-A-AH    "

## 2025-01-21 ENCOUNTER — OFFICE VISIT (OUTPATIENT)
Dept: FAMILY MEDICINE | Facility: CLINIC | Age: 55
End: 2025-01-21
Payer: COMMERCIAL

## 2025-01-21 ENCOUNTER — ANCILLARY PROCEDURE (OUTPATIENT)
Dept: GENERAL RADIOLOGY | Facility: CLINIC | Age: 55
End: 2025-01-21
Attending: FAMILY MEDICINE
Payer: COMMERCIAL

## 2025-01-21 VITALS
DIASTOLIC BLOOD PRESSURE: 94 MMHG | HEIGHT: 73 IN | OXYGEN SATURATION: 98 % | HEART RATE: 60 BPM | BODY MASS INDEX: 31.81 KG/M2 | WEIGHT: 240 LBS | TEMPERATURE: 97.8 F | RESPIRATION RATE: 18 BRPM | SYSTOLIC BLOOD PRESSURE: 146 MMHG

## 2025-01-21 DIAGNOSIS — M54.2 CERVICALGIA: ICD-10-CM

## 2025-01-21 DIAGNOSIS — M50.30 DDD (DEGENERATIVE DISC DISEASE), CERVICAL: ICD-10-CM

## 2025-01-21 DIAGNOSIS — E78.5 HYPERLIPIDEMIA LDL GOAL <100: ICD-10-CM

## 2025-01-21 DIAGNOSIS — Z82.49 FAMILY HISTORY OF ISCHEMIC HEART DISEASE: ICD-10-CM

## 2025-01-21 DIAGNOSIS — R20.2 PARESTHESIA: Primary | ICD-10-CM

## 2025-01-21 DIAGNOSIS — I10 BENIGN ESSENTIAL HYPERTENSION: ICD-10-CM

## 2025-01-21 PROCEDURE — 99214 OFFICE O/P EST MOD 30 MIN: CPT | Performed by: FAMILY MEDICINE

## 2025-01-21 PROCEDURE — 72040 X-RAY EXAM NECK SPINE 2-3 VW: CPT | Mod: TC | Performed by: STUDENT IN AN ORGANIZED HEALTH CARE EDUCATION/TRAINING PROGRAM

## 2025-01-21 RX ORDER — ATORVASTATIN CALCIUM 20 MG/1
20 TABLET, FILM COATED ORAL DAILY
Qty: 90 TABLET | Refills: 1 | Status: SHIPPED | OUTPATIENT
Start: 2025-01-21

## 2025-01-21 RX ORDER — LOSARTAN POTASSIUM 25 MG/1
25 TABLET ORAL DAILY
Qty: 90 TABLET | Refills: 1 | Status: SHIPPED | OUTPATIENT
Start: 2025-01-21

## 2025-01-21 NOTE — NURSING NOTE
"Chief Complaint   Patient presents with    Arm Problem     Worse on the right arm but some symptoms on the left.    Hypertension     He is concerned about elevated BP        Initial BP (!) 146/94   Pulse 60   Temp 97.8  F (36.6  C) (Tympanic)   Resp 18   Ht 1.854 m (6' 1\")   Wt 108.9 kg (240 lb)   SpO2 98%   BMI 31.66 kg/m   Estimated body mass index is 31.66 kg/m  as calculated from the following:    Height as of this encounter: 1.854 m (6' 1\").    Weight as of this encounter: 108.9 kg (240 lb).    Patient presents to the clinic using No DME    Is there anyone who you would like to be able to receive your results? No  If yes have patient fill out SU      "

## 2025-01-21 NOTE — PROGRESS NOTES
Assessment & Plan   Paresthesia  Experiencing neck pain and bilateral arm numbness/tingling for last few weeks.  Differential discussed in detail including but not limited to cervical radiculopathy, nerve impingement.  EMG and x-ray cervical spine ordered, will consider MRI cervical spine if symptoms persist or worsen.  - EMG; Future    Benign essential hypertension  Blood pressure above target goal of less than 140/90.  Previous blood pressure readings reviewed.  Losartan prescribed  - losartan (COZAAR) 25 MG tablet; Take 1 tablet (25 mg) by mouth daily.  - Basic metabolic panel  (Ca, Cl, CO2, Creat, Gluc, K, Na, BUN); Future  - CT Coronary Calcium Scan; Future    Cervicalgia  As above  - XR Cervical Spine 2/3 Views; Future    Family history of ischemic heart disease  As per patient, father had heart attack in his 70s.  Known to have hyperlipidemia, hypertension.  CT calcium score ordered for further evaluation  - CT Coronary Calcium Scan; Future    Hyperlipidemia LDL goal <100  Lipitor prescribed considering cardiovascular risk.  Recommended regular exercise, healthy diet and weight loss  - atorvastatin (LIPITOR) 20 MG tablet; Take 1 tablet (20 mg) by mouth daily.  - Lipid panel; Future  - CT Coronary Calcium Scan; Future      Subjective   Yeison is a 54 year old, presenting for the following health issues:  Arm Problem (Worse on the right arm but some symptoms on the left.) and Hypertension (He is concerned about elevated BP )        1/21/2025     3:37 PM   Additional Questions   Roomed by Steffanie PLAZA   Accompanied by self     History of Present Illness       Reason for visit:  Arm pain tingling numbness  Symptom onset:  3-4 weeks ago  Symptoms include:  Sholder to arm pain tingling arm to finger tips cousing sleepness  Symptom intensity:  Moderate  Symptom progression:  Staying the same  Had these symptoms before:  No  What makes it worse:  Sitting bed time  What makes it better:  Advil   He is taking medications  "regularly.       Concern - He is worried about elevated Blood Pressue  Onset: years   Description: He was on bp meds 8 years ago.  Therapies tried and outcome: none    He has been checking BP at home which has been 140 and greater and 90 on the diastolic.      Review of Systems  Constitutional, neuro, ENT, endocrine, pulmonary, cardiac, gastrointestinal, genitourinary, musculoskeletal, integument and psychiatric systems are negative, except as otherwise noted.      Objective    BP (!) 146/94   Pulse 60   Temp 97.8  F (36.6  C) (Tympanic)   Resp 18   Ht 1.854 m (6' 1\")   Wt 108.9 kg (240 lb)   SpO2 98%   BMI 31.66 kg/m    Body mass index is 31.66 kg/m .  Wt Readings from Last 10 Encounters:   01/21/25 108.9 kg (240 lb)   01/18/24 106.3 kg (234 lb 6.4 oz)   01/14/24 108.5 kg (239 lb 3.2 oz)   01/08/24 109.1 kg (240 lb 8 oz)   12/22/23 112.5 kg (248 lb)   12/11/23 112.5 kg (248 lb)   11/10/23 107.9 kg (237 lb 12.8 oz)      Physical Exam   GENERAL: alert and no distress  EYES: Eyes grossly normal to inspection, PERRL and conjunctivae and sclerae normal  HENT: normal cephalic/atraumatic, nose and mouth without ulcers or lesions, oropharynx clear, and oral mucous membranes moist  NECK: no adenopathy, no asymmetry, masses, or scars  RESP: lungs clear to auscultation - no rales, rhonchi or wheezes  CV: regular rate and rhythm, normal S1 S2, no S3 or S4, no murmur, click or rub, no peripheral edema  MS: no gross musculoskeletal defects noted, no edema  NEURO: Normal strength and tone, mentation intact and speech normal  PSYCH: mentation appears normal, affect normal/bright        Signed Electronically by: Wilian Bland MD    "

## 2025-02-01 ENCOUNTER — TRANSFERRED RECORDS (OUTPATIENT)
Dept: HEALTH INFORMATION MANAGEMENT | Facility: CLINIC | Age: 55
End: 2025-02-01
Payer: COMMERCIAL

## 2025-02-04 ENCOUNTER — MYC MEDICAL ADVICE (OUTPATIENT)
Dept: FAMILY MEDICINE | Facility: CLINIC | Age: 55
End: 2025-02-04
Payer: COMMERCIAL

## 2025-02-17 NOTE — TELEPHONE ENCOUNTER
Patient returned call to the clinic. DNA Guide message was reviewed. He has no questions at this time. Recommended to send DNA Guide message if he has any questions.    Letha Shaffer RN

## 2025-02-17 NOTE — TELEPHONE ENCOUNTER
Left message for patient to review my chart response from Dr Bland and to call care team if has any questions.   Savannah ULLOA RN

## 2025-05-22 ENCOUNTER — OFFICE VISIT (OUTPATIENT)
Dept: FAMILY MEDICINE | Facility: CLINIC | Age: 55
End: 2025-05-22
Payer: COMMERCIAL

## 2025-05-22 VITALS
WEIGHT: 235.4 LBS | DIASTOLIC BLOOD PRESSURE: 84 MMHG | TEMPERATURE: 97 F | BODY MASS INDEX: 31.06 KG/M2 | RESPIRATION RATE: 16 BRPM | HEART RATE: 56 BPM | OXYGEN SATURATION: 99 % | SYSTOLIC BLOOD PRESSURE: 138 MMHG

## 2025-05-22 DIAGNOSIS — R10.9 ABDOMINAL DISCOMFORT: Primary | ICD-10-CM

## 2025-05-22 DIAGNOSIS — G47.00 INSOMNIA, UNSPECIFIED TYPE: ICD-10-CM

## 2025-05-22 ASSESSMENT — PAIN SCALES - GENERAL: PAINLEVEL_OUTOF10: NO PAIN (0)

## 2025-05-22 NOTE — PROGRESS NOTES
"  Assessment & Plan     Abdominal discomfort  Symptoms are quite nonspecific and differential are broad including but not limited to internal hernia, musculoskeletal and neuropathic etiology.  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  CT abdomen/pelvis ordered and recommended to follow-up with GI as well.  Follow-up in 4 to 6 weeks or earlier if needed  - CT Abdomen Pelvis w Contrast; Future  - Adult GI  Referral - Consult Only; Future    Insomnia, unspecified type  Sleep medicine referral placed  - Adult Sleep Eval & Management  Referral; Future        Subjective   Yeison is a 55 year old, presenting for the following health issues:  Chest Pain        5/22/2025     6:52 AM   Additional Questions   Roomed by Solange MONTERO([facility])     History of Present Illness       Reason for visit:  Chest sensations  Symptom onset:  More than a month  Symptoms include:  Chest sensations  Symptom intensity:  Moderate  Symptom progression:  Staying the same  Had these symptoms before:  Yes  Has tried/received treatment for these symptoms:  No  What makes it worse:  Not sure  What makes it better:  Advil   He is taking medications regularly.        Vaccines:Declined    Also having sleeping issues - because is uncomfortable related to some rib pains     Maybe has sleep apnea - wife verbalize that pt snores and stop breathing     Over a year ago - colectomy - after that had a ressetion of his colon - when got home start to having a \"wooshy\" sensation on his chest - couldn't identify where - about a month and half ago pulled a muscle on the back when was working - and had the sensation again - however the back pain went away so is the sensation   Now about 2 weeks ago during shower time - and spine arround wrongly and hit left rib and had some pain and deal with with advil - and the sensation started again -   During this \"whooshy\" sensation start to feel " his hands going numbs - took advil and this sensation when again -       Chest Pain - Whooshy sensation   Onset/Duration: a year ago - but most recently episode happened 2 weeks ago   Description:   Location: entire chest  Character: whooshy / bubbling  Radiation: na  Duration: couple seconds  and intermittent   Intensity: most episodes are mild but the last 2 episodes were more severe because had lightheaded, and numbness on his hands   Progression of Symptoms: intermittent  Accompanying Signs & Symptoms:  Shortness of breath: YES- in the last 2 times   Sweating: YES- in the last 2 times   Nausea/vomiting: No  Lightheadedness: YES- in the past 2 episodes   Palpitations: No  Fever/Chills: No  Cough: No           Heartburn: No  History:   Family history of heart disease: YES- father and mother - coronary heart disease (father)   Tobacco use: No  Previous similar symptoms: no   Precipitating factors:   Worse with exertion: N/A  Worse with deep breaths: N/A           Related to eating: N/A           Better with burping: N/A  Alleviating factors: Advil  Therapies tried and outcome: advil         Review of Systems  Constitutional, neuro, ENT, endocrine, pulmonary, cardiac, gastrointestinal, genitourinary, musculoskeletal, integument and psychiatric systems are negative, except as otherwise noted.      Objective    /84   Pulse 56   Temp 97  F (36.1  C) (Tympanic)   Resp 16   Wt 106.8 kg (235 lb 6.4 oz)   SpO2 99%   BMI 31.06 kg/m    Body mass index is 31.06 kg/m .  Physical Exam   GENERAL: alert and no distress  EYES: Eyes grossly normal to inspection, PERRL and conjunctivae and sclerae normal  NECK: no adenopathy, no asymmetry, masses, or scars  RESP: lungs clear to auscultation - no rales, rhonchi or wheezes  CV: regular rate and rhythm, normal S1 S2, no S3 or S4, no murmur, click or rub, no peripheral edema  ABDOMEN: soft, nontender, no hepatosplenomegaly, no masses and bowel sounds normal  MS: no gross  musculoskeletal defects noted, no edema  SKIN: no suspicious lesions or rashes  NEURO: Normal strength and tone, mentation intact and speech normal  PSYCH: mentation appears normal, anxious, and judgement and insight intact        Signed Electronically by: Wilian Bland MD

## 2025-05-28 PROBLEM — D12.6 ADENOMATOUS POLYP OF COLON: Status: ACTIVE | Noted: 2025-05-28

## 2025-06-08 ENCOUNTER — HEALTH MAINTENANCE LETTER (OUTPATIENT)
Age: 55
End: 2025-06-08

## 2025-06-10 ENCOUNTER — PATIENT OUTREACH (OUTPATIENT)
Dept: CARE COORDINATION | Facility: CLINIC | Age: 55
End: 2025-06-10
Payer: COMMERCIAL

## 2025-06-12 ENCOUNTER — PATIENT OUTREACH (OUTPATIENT)
Dept: CARE COORDINATION | Facility: CLINIC | Age: 55
End: 2025-06-12
Payer: COMMERCIAL

## 2025-06-20 ENCOUNTER — HOSPITAL ENCOUNTER (OUTPATIENT)
Dept: CT IMAGING | Facility: CLINIC | Age: 55
Discharge: HOME | End: 2025-06-20
Attending: FAMILY MEDICINE | Admitting: FAMILY MEDICINE
Payer: COMMERCIAL

## 2025-06-20 DIAGNOSIS — R10.9 ABDOMINAL DISCOMFORT: ICD-10-CM

## 2025-06-20 PROCEDURE — 74177 CT ABD & PELVIS W/CONTRAST: CPT

## 2025-06-20 PROCEDURE — 250N000011 HC RX IP 250 OP 636: Performed by: RADIOLOGY

## 2025-06-20 PROCEDURE — 250N000009 HC RX 250: Performed by: RADIOLOGY

## 2025-06-20 RX ORDER — IOPAMIDOL 755 MG/ML
115 INJECTION, SOLUTION INTRAVASCULAR ONCE
Status: COMPLETED | OUTPATIENT
Start: 2025-06-20 | End: 2025-06-20

## 2025-06-20 RX ADMIN — IOPAMIDOL 115 ML: 755 INJECTION, SOLUTION INTRAVENOUS at 12:01

## 2025-06-20 RX ADMIN — SODIUM CHLORIDE 69 ML: 9 INJECTION, SOLUTION INTRAVENOUS at 12:01

## 2025-06-23 ENCOUNTER — RESULTS FOLLOW-UP (OUTPATIENT)
Dept: FAMILY MEDICINE | Facility: CLINIC | Age: 55
End: 2025-06-23

## 2025-07-23 DIAGNOSIS — I10 BENIGN ESSENTIAL HYPERTENSION: ICD-10-CM

## 2025-07-23 DIAGNOSIS — E78.5 HYPERLIPIDEMIA LDL GOAL <100: ICD-10-CM

## 2025-07-23 RX ORDER — LOSARTAN POTASSIUM 25 MG/1
25 TABLET ORAL DAILY
Qty: 90 TABLET | Refills: 1 | Status: SHIPPED | OUTPATIENT
Start: 2025-07-23

## 2025-07-23 RX ORDER — ATORVASTATIN CALCIUM 20 MG/1
20 TABLET, FILM COATED ORAL DAILY
Qty: 90 TABLET | Refills: 1 | Status: SHIPPED | OUTPATIENT
Start: 2025-07-23

## 2025-07-23 NOTE — TELEPHONE ENCOUNTER
Requested Prescriptions   Pending Prescriptions Disp Refills    losartan (COZAAR) 25 MG tablet [Pharmacy Med Name: LOSARTAN POTASSIUM 25MG TABS] 90 tablet 1     Sig: TAKE 1 TABLET (25 MG) BY MOUTH DAILY.       Angiotensin-II Receptors Failed - 7/23/2025  9:03 AM        Failed - Has GFR on file in past 12 months and most recent value is normal        Failed - Normal serum potassium on file in past 12 months     Recent Labs   Lab Test 01/14/24  2313   POTASSIUM 4.0                    Passed - Most recent blood pressure under 140/90 in past 12 months     BP Readings from Last 3 Encounters:   05/22/25 138/84   01/21/25 (!) 146/94   01/18/24 130/70       No data recorded            Passed - Medication is active on med list and the sig matches. RN to manually verify dose and sig if red X/fail.     If the protocol passes (green check), you do not need to verify med dose and sig.    A prescription matches if they are the same clinical intention.    For Example: once daily and every morning are the same.    The protocol can not identify upper and lower case letters as matching and will fail.     For Example: Take 1 tablet (50 mg) by mouth daily     TAKE 1 TABLET (50 MG) BY MOUTH DAILY    For all fails (red x), verify dose and sig.    If the refill does match what is on file, the RN can still proceed to approve the refill request.       If they do not match, route to the appropriate provider.             Passed - Medication indicated for associated diagnosis     The medication is prescribed for one or more of the following conditions:    Chronic Kidney Disease (CDK)    Heart Failure (HF)    Diabetes, Nephropathy   Hypertension    Coronary Artery Disease (CAD)   Raynaud's Disease   Ischemic cardiomyopathy   Cardiomyopathy   Pulmonary Hypertension          Passed - Recent (12 month) or future (90 days) visit with authorizing provider's specialty (provided they have been seen in the past 15 months)     The patient must have  completed an in-person or virtual visit within the past 12 months or has a future visit scheduled within the next 90 days with the authorizing provider s specialty.  Urgent care and e-visits do not qualify as an office visit for this protocol.          Passed - Patient is age 18 or older          atorvastatin (LIPITOR) 20 MG tablet [Pharmacy Med Name: ATORVASTATIN 20MG TABS] 90 tablet 1     Sig: TAKE ONE TABLET (20 MG) BY MOUTH DAILY.       Antihyperlipidemic agents Failed - 7/23/2025  9:03 AM        Failed - LDL on file in the past 12 months     Recent Labs   Lab Test 11/10/23  0831   *             Passed - Medication is active on med list and the sig matches. RN to manually verify dose and sig if red X/fail.     If the protocol passes (green check), you do not need to verify med dose and sig.    A prescription matches if they are the same clinical intention.    For Example: once daily and every morning are the same.    The protocol can not identify upper and lower case letters as matching and will fail.     For Example: Take 1 tablet (50 mg) by mouth daily     TAKE 1 TABLET (50 MG) BY MOUTH DAILY    For all fails (red x), verify dose and sig.    If the refill does match what is on file, the RN can still proceed to approve the refill request.       If they do not match, route to the appropriate provider.             Passed - Recent (12 month) or future (90 days) visit with authorizing provider's specialty (provided they have been seen in the past 15 months)     The patient must have completed an in-person or virtual visit within the past 12 months or has a future visit scheduled within the next 90 days with the authorizing provider s specialty.  Urgent care and e-visits do not qualify as an office visit for this protocol.          Passed - Patient is age 18 years or older              What Is The Reason For Today's Visit?: History of Melanoma Additional History: Patient has no concerns today for any lesions itching, bleeding, tender, or changing. Year Excised?: 2023 [place] Depth?: .7

## (undated) DEVICE — DAVINCI XI REDUCER 8-12MM 470381

## (undated) DEVICE — DAVINCI XI SEAL UNIVERSAL 5-8MM 470361

## (undated) DEVICE — GLOVE UNDER INDICATOR PI SZ 7.0 LF 41670

## (undated) DEVICE — BLADE KNIFE SURG 11 371111

## (undated) DEVICE — SUCTION TIP YANKAUER W/O VENT K86

## (undated) DEVICE — SUTURE ENDO SURGITIE 21 POLYSORB EL23LN

## (undated) DEVICE — SYR 50ML SLIP TIP W/O NDL 309654

## (undated) DEVICE — LUBRICANT INST ELECTROLUBE EL101

## (undated) DEVICE — DRAPE U SPLIT 74X120" 29440

## (undated) DEVICE — SOL WATER IRRIG 1000ML BOTTLE 2F7114

## (undated) DEVICE — CATH TRAY FOLEY SURESTEP 16FR DRAIN BAG STATOCK A899916

## (undated) DEVICE — GLOVE UNDER INDICATOR PI SZ 6.5 LF 41665

## (undated) DEVICE — DAVINCI XI OBTURATOR BLADELESS 8MM 470359

## (undated) DEVICE — SU DERMABOND ADVANCED .7ML DNX12

## (undated) DEVICE — DRSG TEGADERM 2 3/8X2 3/4" 1624W

## (undated) DEVICE — DRSG STERI STRIP 1/2X4" R1547

## (undated) DEVICE — DRAPE LAP/CHOLE W/O POUCH 89225

## (undated) DEVICE — GLOVE BIOGEL PI ULTRATOUCH G SZ 6.5 42165

## (undated) DEVICE — Device

## (undated) DEVICE — DAVINCI HOT SHEARS TIP COVER  400180

## (undated) DEVICE — DRSG GAUZE 4X4" 3033

## (undated) DEVICE — DRAPE POUCH INSTRUMENT 3 POCKET 1018L

## (undated) DEVICE — SUCTION STRYKERFLOW II 250-070-500

## (undated) DEVICE — DAVINCI XI HANDPIECE ESU VESSEL SEALER 8MM EXT 480422

## (undated) DEVICE — NDL INSUFFLATION 13GA 120MM C2201

## (undated) DEVICE — TUBING FILTER TRI-LUMEN AIRSEAL ASC-EVAC1

## (undated) DEVICE — STPL DAVINCI SUREFORM 60MM RELOAD BLUE 48360B

## (undated) DEVICE — CUSTOM PACK LAP CHOLE SBA5BLCHEA

## (undated) DEVICE — ANTIFOG SOLUTION SEE SHARP 150M TROCAR SWABS 30978

## (undated) DEVICE — PROTECTOR ARM STANDARD ONE STEP

## (undated) DEVICE — ESU PENCIL SMOKE EVAC W/ROCKER SWITCH 0703-047-000

## (undated) DEVICE — SU VICRYL+ 3-0 27IN SH UND VCP416H

## (undated) DEVICE — DAVINCI XI DRAPE COLUMN 470341

## (undated) DEVICE — SYR 10ML LL W/O NDL 302995

## (undated) DEVICE — STPL DAVINCI SUREFORM 60MM STR 480460

## (undated) DEVICE — PAD POS XL 1X20X40IN PINK PIGAZZI

## (undated) DEVICE — PREP CHLORAPREP 26ML TINTED HI-LITE ORANGE 930815

## (undated) DEVICE — GLOVE BIOGEL INDICATOR 7.5 LF 41675

## (undated) DEVICE — GOWN IMPERVIOUS BREATHABLE SMART XLG 89045

## (undated) DEVICE — GOWN XLG DISP 9545

## (undated) DEVICE — SPONGE RAY-TEC 4X8" 7318

## (undated) DEVICE — SU VICRYL+ 0 27 UR6 VLT VCP603H

## (undated) DEVICE — SUTURE PDS 1 CTB-1 ZB347

## (undated) DEVICE — SU MONOCRYL+ 4-0 18IN PS2 UND MCP496G

## (undated) DEVICE — SU STRATAFIX PDS PLUS 3-0 SPIRAL SH 15CM SXPP1B420

## (undated) DEVICE — ENDO SHEARS RENEW LAP ENDOCUT SCISSOR TIP 16.5MM 3142

## (undated) DEVICE — TUBING SUCTION MEDI-VAC 1/4"X20' N620A

## (undated) DEVICE — DAVINCI XI DRAPE ARM 470015

## (undated) DEVICE — ENDO OBTURATOR ACCESS PORT BLADELESS 8X100MM IAS8-100LP

## (undated) DEVICE — DRAPE IOBAN INCISE 23X17" 6650EZ

## (undated) DEVICE — DAVINCI SEAL CANNULA AND STPL 12MM 470380

## (undated) RX ORDER — ONDANSETRON 2 MG/ML
INJECTION INTRAMUSCULAR; INTRAVENOUS
Status: DISPENSED
Start: 2024-01-08

## (undated) RX ORDER — LIDOCAINE HYDROCHLORIDE 10 MG/ML
INJECTION, SOLUTION EPIDURAL; INFILTRATION; INTRACAUDAL; PERINEURAL
Status: DISPENSED
Start: 2024-01-08

## (undated) RX ORDER — CEFAZOLIN SODIUM 1 G/3ML
INJECTION, POWDER, FOR SOLUTION INTRAMUSCULAR; INTRAVENOUS
Status: DISPENSED
Start: 2024-01-08

## (undated) RX ORDER — GLYCOPYRROLATE 0.2 MG/ML
INJECTION, SOLUTION INTRAMUSCULAR; INTRAVENOUS
Status: DISPENSED
Start: 2024-01-08

## (undated) RX ORDER — FENTANYL CITRATE 50 UG/ML
INJECTION, SOLUTION INTRAMUSCULAR; INTRAVENOUS
Status: DISPENSED
Start: 2024-01-08

## (undated) RX ORDER — DEXAMETHASONE SODIUM PHOSPHATE 10 MG/ML
INJECTION, SOLUTION INTRAMUSCULAR; INTRAVENOUS
Status: DISPENSED
Start: 2024-01-08

## (undated) RX ORDER — EPHEDRINE SULFATE 50 MG/ML
INJECTION, SOLUTION INTRAMUSCULAR; INTRAVENOUS; SUBCUTANEOUS
Status: DISPENSED
Start: 2024-01-08

## (undated) RX ORDER — PROPOFOL 10 MG/ML
INJECTION, EMULSION INTRAVENOUS
Status: DISPENSED
Start: 2024-01-08

## (undated) RX ORDER — INDOCYANINE GREEN AND WATER 25 MG
KIT INJECTION
Status: DISPENSED
Start: 2024-01-08